# Patient Record
Sex: FEMALE | Race: OTHER | HISPANIC OR LATINO | Employment: FULL TIME | URBAN - METROPOLITAN AREA
[De-identification: names, ages, dates, MRNs, and addresses within clinical notes are randomized per-mention and may not be internally consistent; named-entity substitution may affect disease eponyms.]

---

## 2017-10-21 ENCOUNTER — OFFICE VISIT (OUTPATIENT)
Dept: URGENT CARE | Facility: CLINIC | Age: 47
End: 2017-10-21
Payer: COMMERCIAL

## 2017-10-21 ENCOUNTER — APPOINTMENT (OUTPATIENT)
Dept: RADIOLOGY | Facility: CLINIC | Age: 47
End: 2017-10-21
Payer: COMMERCIAL

## 2017-10-21 DIAGNOSIS — S99.919A INJURY OF ANKLE: ICD-10-CM

## 2017-10-21 PROCEDURE — 73610 X-RAY EXAM OF ANKLE: CPT

## 2017-10-21 PROCEDURE — 99213 OFFICE O/P EST LOW 20 MIN: CPT

## 2017-10-22 NOTE — PROGRESS NOTES
Assessment  1  Sprain of other ligament of right ankle, initial encounter (416 57) (B89 276Q)    Plan  Ankle injury    · * XR ANKLE 3+ VIEW RIGHT; Status:Resulted - Requires Verification;   Done: [id number]  08:23AM  Sprain of other ligament of right ankle, initial encounter    · Ace Bandage; Status:Complete;   Done: [id number]   · Air cast; Status:Complete;   Done: [id number]    Discussion/Summary  Discussion Summary:   As we discussed, the x-rays are negative for fracture  This is a sprain  These injuries continue a while to heal  Follow-up with your family physician for further evaluation if you are still having pain and swelling after a couple of weeks  Chief Complaint  1  Ankle Pain  Chief Complaint Free Text Note Form: Yesterday the patient reports rolling her right ankle  She reports increased pain with walking and pressure  History of Present Illness  HPI: Patient is a 48-year-old female who injured her right ankle when it twisted as she got off the bus yesterday  She is having increased pain, and difficulty walking  Hospital Based Practices Required Assessment:   Pain Assessment   the patient states they have pain  The pain is located in the right ankle  The patient describes the pain as throbbing  (on a scale of 0 to 10, the patient rates the pain at 3 )   Abuse And Domestic Violence Screen    Yes, the patient is safe at home  -- The patient states no one is hurting them  Depression And Suicide Screen  No, the patient has not had thoughts of hurting themself  No, the patient has not felt depressed in the past 7 days  Review of Systems  Focused-Female:   Constitutional: No fever, no chills, feels well, no tiredness, no recent weight gain or loss  ENT: no ear ache, no loss of hearing, no nosebleeds or nasal discharge, no sore throat or hoarseness  Respiratory: no complaints of shortness of breath, no wheezing, no dyspnea on exertion, no orthopnea or PND     Musculoskeletal: as noted in HPI    Integumentary: no complaints of skin rash or lesion, no itching or dry skin, no skin wounds  Active Problems  1  Acute bronchitis (466 0) (J20 9)    Past Medical History  1  Acute sinusitis (461 9) (J01 90)   2  History of asthma (V12 69) (Z87 09)    Family History  Mother    1  No pertinent family history    Social History   · Never smoker   · No drug use   · Social alcohol use (Z78 9)    Current Meds   1  No Reported Medications Recorded    Allergies  1  Penicillins    Vitals  Signs   Recorded: [id number] 08:14AM   Temperature: 99 2 F  Heart Rate: 100  Respiration: 16  Systolic: 324  Diastolic: 88  Height: 5 ft 2 in  Weight: 172 lb   BMI Calculated: 31 46  BSA Calculated: 1 79  O2 Saturation: 96  Pain Scale: 3    Physical Exam    Constitutional   General appearance: No acute distress, well appearing and well nourished  Eyes   Conjunctiva and lids: No swelling, erythema or discharge  Ears, Nose, Mouth, and Throat   External inspection of ears and nose: Normal     Pulmonary   Respiratory effort: No increased work of breathing or signs of respiratory distress  Musculoskeletal   Gait and station: Abnormal  -- She is walking with a cane and exhibits significant limp  Digits and nails: Normal without clubbing or cyanosis  Inspection/palpation of joints, bones, and muscles: Abnormal  -- The ankle appears to be stable  There is swelling and tenderness over the lateral malleolus  Skin   Skin and subcutaneous tissue: Normal without rashes or lesions  Psychiatric   Orientation to person, place, and time: Normal     Mood and affect: Normal        Results/Data  * XR ANKLE 3+ VIEW RIGHT 21Oct2017 08:23AM [facility], [address] [place] [address] Order Number: [id number]     Test Name Result Flag Reference   XR ANKLE 3+ VW RIGHT (Report)     RIGHT ANKLE     INDICATION: Right ankle pain and swelling, twisted ankle yesterday       COMPARISON: None     VIEWS: 3     IMAGES: 3     FINDINGS:     There is no acute fracture or dislocation  Suspected small ankle joint effusion  No degenerative changes  No lytic or blastic lesions are seen  Soft tissue swelling overlying the lateral malleolus  IMPRESSION:     No acute osseous abnormality  Lateral soft tissue swelling  Workstation performed: [id number]     Signed by:   Marshall Vaughn DO   10/21/17       Message  Return to work or school:   Alvaro Ballesteros is under my professional care   She was seen in my office on 10/21/2017    She is not able to return to work until 10/25/17           Signatures   Electronically signed by : Roberta Hartley MD; Oct 21 2017 10:19AM EST                       (Author)

## 2019-09-10 ENCOUNTER — OFFICE VISIT (OUTPATIENT)
Dept: GASTROENTEROLOGY | Facility: CLINIC | Age: 49
End: 2019-09-10
Payer: COMMERCIAL

## 2019-09-10 ENCOUNTER — TELEPHONE (OUTPATIENT)
Dept: GASTROENTEROLOGY | Facility: CLINIC | Age: 49
End: 2019-09-10

## 2019-09-10 ENCOUNTER — DOCUMENTATION (OUTPATIENT)
Dept: GASTROENTEROLOGY | Facility: CLINIC | Age: 49
End: 2019-09-10

## 2019-09-10 VITALS
DIASTOLIC BLOOD PRESSURE: 74 MMHG | HEART RATE: 84 BPM | BODY MASS INDEX: 32.39 KG/M2 | HEIGHT: 62 IN | SYSTOLIC BLOOD PRESSURE: 122 MMHG | WEIGHT: 176 LBS

## 2019-09-10 DIAGNOSIS — K21.9 GASTROESOPHAGEAL REFLUX DISEASE, ESOPHAGITIS PRESENCE NOT SPECIFIED: Primary | ICD-10-CM

## 2019-09-10 DIAGNOSIS — R13.10 DYSPHAGIA, UNSPECIFIED TYPE: ICD-10-CM

## 2019-09-10 PROCEDURE — 99204 OFFICE O/P NEW MOD 45 MIN: CPT | Performed by: NURSE PRACTITIONER

## 2019-09-10 RX ORDER — RANITIDINE 150 MG/1
150 TABLET ORAL 2 TIMES DAILY
COMMUNITY

## 2019-09-10 NOTE — PROGRESS NOTES
[facility] - Outpatient Consultation  Belinda Live 51 y o  female MRN: [id number]  Encounter: [id number]      Chief Complaint   Patient presents with    Dysphagia     1  Gastroesophageal reflux disease, esophagitis presence not specified  New onset of GERD that has progressed over the past year and is only partially alleviated with H2 antagonist therapy with Zantac over-the-counter medication  Exclude the presence of Baer's esophagus, esophagitis or gastritis  Rule out peptic ulcer disease     - Nexium 20 mg daily  Sample boxes prior to the patient  - can add Zantac at nighttime if not effective  - reviewed lifestyle modifications to include no eating prior to bedtime, avoiding spicy, fried and acidic type foods and caffeinated beverages  - arrange for an upper endoscopy    2  Dysphagia, unspecified type  Solid-food dysphagia that has also progressed over the past year  Possibilities include an esophageal ring or stricture  Unlikely a gastric or and esophageal neoplasm     - an upper endoscopy to be arranged in the near future      Followup Appointment:  6 weeks  ______________________________________________________________________    Chief Complaint   Patient presents with    Dysphagia       HPI:   Belinda Live is a 51y o  year old female who presents to the office for progressive heartburn and solid food dysphagia over the past year  Occasional odynophagia  Denies early satiety or weight loss  Denies frequent use of NSAIDs  Was on doxycycline for an upper respiratory infection a few months ago  Has gained 30 lb over the past 2 years  Associated water brash  Denies nausea or vomiting  No melena or rectal bleeding  Has always been more on the constipated side      Historical Information   Past Medical History:   Diagnosis Date    Fatigue     Chronic fatigue    GERD (gastroesophageal reflux disease)     Obesity      Past Surgical History:   Procedure Laterality Date    EXPLORATORY LAPAROTOMY       Social History     Substance and Sexual Activity   Alcohol Use Yes    Frequency: 2-4 times a month    Drinks per session: 1 or 2     Social History     Substance and Sexual Activity   Drug Use Not on file     Social History     Tobacco Use   Smoking Status Never Smoker   Smokeless Tobacco Never Used     Family History   Problem Relation Age of Onset    No Known Problems Mother     No Known Problems Father     No Known Problems Sister     No Known Problems Brother        Meds/Allergies     Current Outpatient Medications:     ranitidine (ZANTAC) 150 mg tablet    Allergies   Allergen Reactions    Penicillins        PHYSICAL EXAM:    Blood pressure 122/74, pulse 84, height 5' 2" (1 575 m), weight 79 8 kg (176 lb)  Body mass index is 32 19 kg/m²  General Appearance: NAD, cooperative, alert  Eyes: Anicteric, PERRLA, EOMI  ENT:  Normocephalic, atraumatic, normal mucosa  Neck:  Supple, symmetrical, trachea midline  Resp:  Clear to auscultation bilaterally; no rales, rhonchi or wheezing; respirations unlabored   CV:  S1 S2, Regular rate and rhythm; no murmur, rub, or gallop  GI:  Soft, non-tender, non-distended; normal bowel sounds; no masses, no organomegaly   Rectal: Deferred  :  Deferred   Musculoskeletal: No cyanosis, clubbing or edema  Normal ROM  Skin:  No jaundice, rashes, or lesions   Back: No CVA tenderness  Psych: Normal affect, good eye contact  Neuro: No gross deficits, AAOx3    Lab Results:   No results found for: WBC, HGB, HCT, MCV, PLT  No results found for: NA, K, CL, CO2, ANIONGAP, BUN, CREATININE, GLUCOSE, GLUF, CALCIUM, CORRECTEDCA, AST, ALT, ALKPHOS, PROT, BILITOT, EGFR  No results found for: IRON, TIBC, FERRITIN  No results found for: LIPASE    Radiology Results:   No results found  REVIEW OF SYSTEMS:    CONSTITUTIONAL: Denies any fever, chills, rigors, and weight loss    Positive for fatigue, night sweats and weight gain  HEENT: No earache or tinnitus  Denies hearing loss or visual disturbances  CARDIOVASCULAR: No chest pain or palpitations  RESPIRATORY: Denies any cough, hemoptysis, shortness of breath or dyspnea on exertion  GASTROINTESTINAL: As noted in the History of Present Illness  GENITOURINARY: No problems with urination  Denies any hematuria or dysuria  NEUROLOGIC: No dizziness or vertigo, denies headaches  MUSCULOSKELETAL: Denies any muscle or joint pain  SKIN: Denies skin rashes or itching  ENDOCRINE: Denies excessive thirst  Denies intolerance to heat or cold  PSYCHOSOCIAL: Denies depression or anxiety  Denies any recent memory loss

## 2019-09-10 NOTE — PATIENT INSTRUCTIONS
Try to avoid spicy, fatty, spicy and caffeinated foods  Try to avoid eating 3 hours before bedtime  Trial of Nexium 20 mg daily    Samples provided  Will arrange for an upper endoscopy

## 2021-08-03 ENCOUNTER — OFFICE VISIT (OUTPATIENT)
Dept: GASTROENTEROLOGY | Facility: CLINIC | Age: 51
End: 2021-08-03
Payer: COMMERCIAL

## 2021-08-03 VITALS
WEIGHT: 160 LBS | BODY MASS INDEX: 29.44 KG/M2 | DIASTOLIC BLOOD PRESSURE: 80 MMHG | TEMPERATURE: 98.5 F | HEART RATE: 107 BPM | HEIGHT: 62 IN | SYSTOLIC BLOOD PRESSURE: 120 MMHG

## 2021-08-03 DIAGNOSIS — Z12.11 SCREENING FOR COLORECTAL CANCER: ICD-10-CM

## 2021-08-03 DIAGNOSIS — K21.9 GASTROESOPHAGEAL REFLUX DISEASE, UNSPECIFIED WHETHER ESOPHAGITIS PRESENT: ICD-10-CM

## 2021-08-03 DIAGNOSIS — Z12.12 SCREENING FOR COLORECTAL CANCER: ICD-10-CM

## 2021-08-03 DIAGNOSIS — R13.10 DYSPHAGIA, UNSPECIFIED TYPE: Primary | ICD-10-CM

## 2021-08-03 PROCEDURE — 99203 OFFICE O/P NEW LOW 30 MIN: CPT | Performed by: PHYSICIAN ASSISTANT

## 2021-08-03 RX ORDER — FEXOFENADINE HCL 180 MG/1
180 TABLET ORAL DAILY
COMMUNITY
Start: 2021-07-07

## 2021-08-03 RX ORDER — ALBUTEROL SULFATE 90 UG/1
AEROSOL, METERED RESPIRATORY (INHALATION)
COMMUNITY
Start: 2021-05-30

## 2021-08-03 RX ORDER — DEXTROAMPHETAMINE SACCHARATE, AMPHETAMINE ASPARTATE MONOHYDRATE, DEXTROAMPHETAMINE SULFATE AND AMPHETAMINE SULFATE 5; 5; 5; 5 MG/1; MG/1; MG/1; MG/1
20 CAPSULE, EXTENDED RELEASE ORAL DAILY
COMMUNITY
Start: 2021-07-26

## 2021-08-03 RX ORDER — DEXAMETHASONE 4 MG/1
1 TABLET ORAL 2 TIMES DAILY
COMMUNITY
Start: 2021-07-21

## 2021-08-03 RX ORDER — FLUTICASONE PROPIONATE 50 MCG
1 SPRAY, SUSPENSION (ML) NASAL 2 TIMES DAILY
COMMUNITY
Start: 2021-06-04

## 2021-08-03 RX ORDER — DEXTROAMPHETAMINE SACCHARATE, AMPHETAMINE ASPARTATE, DEXTROAMPHETAMINE SULFATE AND AMPHETAMINE SULFATE 2.5; 2.5; 2.5; 2.5 MG/1; MG/1; MG/1; MG/1
TABLET ORAL
COMMUNITY
Start: 2021-07-28

## 2021-08-03 NOTE — PROGRESS NOTES
[facility] - Outpatient Consultation  Irene Garcia 45 y o  female MRN: [id number]  Encounter: [id number]          ASSESSMENT AND PLAN:      1  Dysphagia, unspecified type  She reports progressive difficulty swallowing solid food over the past 2 years  She had more mild symptoms in 2019 and underwent EGD which showed hiatal hernia and moderate reflux esophagitis  There was no evidence of stricture, ring, or eosinophilic esophagitis  Since her symptoms have worsened, I would recommend repeat EGD at this time  If her EGD is negative, I would recommend esophageal manometry testing to assess for motility disorder  We discussed taking small bites of food, chewing carefully, drinking plenty of water after each bite  - EGD; Future    2  Gastroesophageal reflux disease, unspecified whether esophagitis present  She reports history of heartburn which was evaluated with EGD in 2019  This showed hiatal hernia and moderate reflux esophagitis but no evidence of Baer's esophagus  Her heartburn resolved with dietary changes  She is no longer on acid suppression  3  Screening for colorectal cancer  She has a Cologuard test kit at home which she will complete soon  Follow-up after EGD  ______________________________________________________________________    HPI:   53-year-old female with history of GERD presenting for evaluation of dysphagia  She was seen by [facility] gastroenterology back in September 2019 for these symptoms  She underwent EGD which showed hiatal hernia and erythema at the gastroesophageal junction  Biopsies consistent with reflux esophagitis and negative for Baer's esophagus, EOE, malignancy  She states that her dysphagia was mild at that time but has gotten significantly worse over the past 2 years  She has difficulty swallowing solid food every time she eats  The only food that does not cause her symptoms is salad    She feels the food gets stuck in her lower chest  She needs to take very small bites and drink plenty of water after each bite to get the food down  She also reports some pain with swallowing as well  She denies any heartburn as she recently changed her diet  She is not on any acid suppression at current time  She was on Nexium several months ago which did help her heartburn but not the dysphagia  REVIEW OF SYSTEMS:    CONSTITUTIONAL: Denies any fever, chills, rigors, and weight loss  HEENT: No earache or tinnitus  Denies hearing loss or visual disturbances  CARDIOVASCULAR: No chest pain or palpitations  RESPIRATORY: Denies any cough, hemoptysis, shortness of breath or dyspnea on exertion  GASTROINTESTINAL: As noted in the History of Present Illness  GENITOURINARY: No problems with urination  Denies any hematuria or dysuria  NEUROLOGIC: No dizziness or vertigo, denies headaches  MUSCULOSKELETAL: Denies any muscle or joint pain  SKIN: Denies skin rashes or itching  ENDOCRINE: Denies excessive thirst  Denies intolerance to heat or cold  PSYCHOSOCIAL: Denies depression or anxiety  Denies any recent memory loss         Historical Information   Past Medical History:   Diagnosis Date    Fatigue     Chronic fatigue    GERD (gastroesophageal reflux disease)     Obesity      Past Surgical History:   Procedure Laterality Date    EXPLORATORY LAPAROTOMY       Social History   Social History     Substance and Sexual Activity   Alcohol Use Yes     Social History     Substance and Sexual Activity   Drug Use Not on file     Social History     Tobacco Use   Smoking Status Never Smoker   Smokeless Tobacco Never Used     Family History   Problem Relation Age of Onset    No Known Problems Mother     No Known Problems Father     No Known Problems Sister     No Known Problems Brother        Meds/Allergies       Current Outpatient Medications:     albuterol (PROVENTIL HFA,VENTOLIN HFA) 90 mcg/act inhaler    amphetamine-dextroamphetamine (ADDERALL XR) 20 MG 24 hr capsule    amphetamine-dextroamphetamine (ADDERALL) 10 mg tablet    fexofenadine (ALLEGRA) 180 MG tablet    Flovent  MCG/ACT inhaler    fluticasone (FLONASE) 50 mcg/act nasal spray    ranitidine (ZANTAC) 150 mg tablet    Allergies   Allergen Reactions    Penicillins            Objective     Blood pressure 120/80, pulse (!) 107, temperature 98 5 °F (36 9 °C), temperature source Tympanic, height 5' 2" (1 575 m), weight 72 6 kg (160 lb)  Body mass index is 29 26 kg/m²  PHYSICAL EXAM:      General Appearance:   Alert, cooperative, no distress   HEENT:   Normocephalic, atraumatic, anicteric      Neck:  Supple, symmetrical, trachea midline   Lungs:   Clear to auscultation bilaterally; no rales, rhonchi or wheezing; respirations unlabored    Heart[de-identified]   Regular rate and rhythm; no murmur, rub, or gallop  Abdomen:   Soft, non-tender, non-distended; normal bowel sounds; no masses, no organomegaly    Genitalia:   Deferred    Rectal:   Deferred    Extremities:  No cyanosis, clubbing or edema    Pulses:  2+ and symmetric    Skin:  No jaundice, rashes, or lesions    Lymph nodes:  No palpable cervical lymphadenopathy        Lab Results:   No visits with results within 1 Day(s) from this visit  Latest known visit with results is:   No results found for any previous visit  Radiology Results:   No results found

## 2021-08-03 NOTE — LETTER
August 3, 2021     [facility] [address]  [address] [facility]    Patient: Mariano Arreola   YOB: 1970   Date of Visit: 8/3/2021       Dear Dr Saintclair Curling: Thank you for referring Mariano Arreola to me for evaluation  Below are my notes for this consultation  If you have questions, please do not hesitate to call me  I look forward to following your patient along with you  Sincerely,        Balinda Hammans, PA-C        CC: No Recipients  Balinda Hammans, PA-C  8/3/2021 11:01 AM  Sign when Signing Visit  [facility] - Outpatient Consultation  Mariano Arreola 45 y o  female MRN: [id number]  Encounter: [id number]          ASSESSMENT AND PLAN:      1  Dysphagia, unspecified type  She reports progressive difficulty swallowing solid food over the past 2 years  She had more mild symptoms in 2019 and underwent EGD which showed hiatal hernia and moderate reflux esophagitis  There was no evidence of stricture, ring, or eosinophilic esophagitis  Since her symptoms have worsened, I would recommend repeat EGD at this time  If her EGD is negative, I would recommend esophageal manometry testing to assess for motility disorder  We discussed taking small bites of food, chewing carefully, drinking plenty of water after each bite  - EGD; Future    2  Gastroesophageal reflux disease, unspecified whether esophagitis present  She reports history of heartburn which was evaluated with EGD in 2019  This showed hiatal hernia and moderate reflux esophagitis but no evidence of Baer's esophagus  Her heartburn resolved with dietary changes  She is no longer on acid suppression  3  Screening for colorectal cancer  She has a Cologuard test kit at home which she will complete soon  Follow-up after EGD  ______________________________________________________________________    HPI:   62-year-old female with history of GERD presenting for evaluation of dysphagia  She was seen by [facility] gastroenterology back in September 2019 for these symptoms  She underwent EGD which showed hiatal hernia and erythema at the gastroesophageal junction  Biopsies consistent with reflux esophagitis and negative for Baer's esophagus, EOE, malignancy  She states that her dysphagia was mild at that time but has gotten significantly worse over the past 2 years  She has difficulty swallowing solid food every time she eats  The only food that does not cause her symptoms is salad  She feels the food gets stuck in her lower chest   She needs to take very small bites and drink plenty of water after each bite to get the food down  She also reports some pain with swallowing as well  She denies any heartburn as she recently changed her diet  She is not on any acid suppression at current time  She was on Nexium several months ago which did help her heartburn but not the dysphagia  REVIEW OF SYSTEMS:    CONSTITUTIONAL: Denies any fever, chills, rigors, and weight loss  HEENT: No earache or tinnitus  Denies hearing loss or visual disturbances  CARDIOVASCULAR: No chest pain or palpitations  RESPIRATORY: Denies any cough, hemoptysis, shortness of breath or dyspnea on exertion  GASTROINTESTINAL: As noted in the History of Present Illness  GENITOURINARY: No problems with urination  Denies any hematuria or dysuria  NEUROLOGIC: No dizziness or vertigo, denies headaches  MUSCULOSKELETAL: Denies any muscle or joint pain  SKIN: Denies skin rashes or itching  ENDOCRINE: Denies excessive thirst  Denies intolerance to heat or cold  PSYCHOSOCIAL: Denies depression or anxiety  Denies any recent memory loss         Historical Information   Past Medical History:   Diagnosis Date    Fatigue     Chronic fatigue    GERD (gastroesophageal reflux disease)     Obesity      Past Surgical History:   Procedure Laterality Date    EXPLORATORY LAPAROTOMY       Social History   Social History Substance and Sexual Activity   Alcohol Use Yes     Social History     Substance and Sexual Activity   Drug Use Not on file     Social History     Tobacco Use   Smoking Status Never Smoker   Smokeless Tobacco Never Used     Family History   Problem Relation Age of Onset    No Known Problems Mother     No Known Problems Father     No Known Problems Sister     No Known Problems Brother        Meds/Allergies       Current Outpatient Medications:     albuterol (PROVENTIL HFA,VENTOLIN HFA) 90 mcg/act inhaler    amphetamine-dextroamphetamine (ADDERALL XR) 20 MG 24 hr capsule    amphetamine-dextroamphetamine (ADDERALL) 10 mg tablet    fexofenadine (ALLEGRA) 180 MG tablet    Flovent  MCG/ACT inhaler    fluticasone (FLONASE) 50 mcg/act nasal spray    ranitidine (ZANTAC) 150 mg tablet    Allergies   Allergen Reactions    Penicillins            Objective     Blood pressure 120/80, pulse (!) 107, temperature 98 5 °F (36 9 °C), temperature source Tympanic, height 5' 2" (1 575 m), weight 72 6 kg (160 lb)  Body mass index is 29 26 kg/m²  PHYSICAL EXAM:      General Appearance:   Alert, cooperative, no distress   HEENT:   Normocephalic, atraumatic, anicteric      Neck:  Supple, symmetrical, trachea midline   Lungs:   Clear to auscultation bilaterally; no rales, rhonchi or wheezing; respirations unlabored    Heart[de-identified]   Regular rate and rhythm; no murmur, rub, or gallop  Abdomen:   Soft, non-tender, non-distended; normal bowel sounds; no masses, no organomegaly    Genitalia:   Deferred    Rectal:   Deferred    Extremities:  No cyanosis, clubbing or edema    Pulses:  2+ and symmetric    Skin:  No jaundice, rashes, or lesions    Lymph nodes:  No palpable cervical lymphadenopathy        Lab Results:   No visits with results within 1 Day(s) from this visit  Latest known visit with results is:   No results found for any previous visit  Radiology Results:   No results found

## 2021-08-03 NOTE — PATIENT INSTRUCTIONS
EGD scheduled for 9/9 with Dr Arnol Gayle at [facility] provided verbal instructions/ paper work given

## 2021-09-08 ENCOUNTER — TELEPHONE (OUTPATIENT)
Dept: GASTROENTEROLOGY | Facility: HOSPITAL | Age: 51
End: 2021-09-08

## 2021-09-09 ENCOUNTER — ANESTHESIA (OUTPATIENT)
Dept: GASTROENTEROLOGY | Facility: HOSPITAL | Age: 51
End: 2021-09-09

## 2021-09-09 ENCOUNTER — HOSPITAL ENCOUNTER (OUTPATIENT)
Dept: GASTROENTEROLOGY | Facility: HOSPITAL | Age: 51
Setting detail: OUTPATIENT SURGERY
Discharge: HOME | End: 2021-09-09
Attending: INTERNAL MEDICINE
Payer: COMMERCIAL

## 2021-09-09 ENCOUNTER — ANESTHESIA EVENT (OUTPATIENT)
Dept: GASTROENTEROLOGY | Facility: HOSPITAL | Age: 51
End: 2021-09-09

## 2021-09-09 VITALS
RESPIRATION RATE: 16 BRPM | TEMPERATURE: 97.5 F | HEART RATE: 72 BPM | SYSTOLIC BLOOD PRESSURE: 116 MMHG | DIASTOLIC BLOOD PRESSURE: 75 MMHG | OXYGEN SATURATION: 98 %

## 2021-09-09 DIAGNOSIS — R13.10 DYSPHAGIA, UNSPECIFIED TYPE: ICD-10-CM

## 2021-09-09 DIAGNOSIS — K21.00 GASTROESOPHAGEAL REFLUX DISEASE WITH ESOPHAGITIS WITHOUT HEMORRHAGE: Primary | ICD-10-CM

## 2021-09-09 PROBLEM — J45.909 ASTHMA: Status: ACTIVE | Noted: 2021-09-09

## 2021-09-09 LAB
EXT PREGNANCY TEST URINE: NEGATIVE
EXT. CONTROL: NORMAL

## 2021-09-09 PROCEDURE — 81025 URINE PREGNANCY TEST: CPT | Performed by: ANESTHESIOLOGY

## 2021-09-09 PROCEDURE — C1726 CATH, BAL DIL, NON-VASCULAR: HCPCS

## 2021-09-09 PROCEDURE — 88305 TISSUE EXAM BY PATHOLOGIST: CPT | Performed by: PATHOLOGY

## 2021-09-09 PROCEDURE — 43249 ESOPH EGD DILATION <30 MM: CPT | Performed by: INTERNAL MEDICINE

## 2021-09-09 PROCEDURE — 43239 EGD BIOPSY SINGLE/MULTIPLE: CPT | Performed by: INTERNAL MEDICINE

## 2021-09-09 RX ORDER — LIDOCAINE HYDROCHLORIDE 10 MG/ML
INJECTION, SOLUTION EPIDURAL; INFILTRATION; INTRACAUDAL; PERINEURAL AS NEEDED
Status: DISCONTINUED | OUTPATIENT
Start: 2021-09-09 | End: 2021-09-09

## 2021-09-09 RX ORDER — PROPOFOL 10 MG/ML
INJECTION, EMULSION INTRAVENOUS AS NEEDED
Status: DISCONTINUED | OUTPATIENT
Start: 2021-09-09 | End: 2021-09-09

## 2021-09-09 RX ORDER — OMEPRAZOLE 40 MG/1
40 CAPSULE, DELAYED RELEASE ORAL DAILY
Qty: 90 CAPSULE | Refills: 1 | Status: SHIPPED | OUTPATIENT
Start: 2021-09-09 | End: 2022-02-19

## 2021-09-09 RX ORDER — SODIUM CHLORIDE 9 MG/ML
INJECTION, SOLUTION INTRAVENOUS CONTINUOUS PRN
Status: DISCONTINUED | OUTPATIENT
Start: 2021-09-09 | End: 2021-09-09

## 2021-09-09 RX ADMIN — PROPOFOL 120 MG: 10 INJECTION, EMULSION INTRAVENOUS at 14:34

## 2021-09-09 RX ADMIN — PROPOFOL 30 MG: 10 INJECTION, EMULSION INTRAVENOUS at 14:36

## 2021-09-09 RX ADMIN — SODIUM CHLORIDE: 9 INJECTION, SOLUTION INTRAVENOUS at 14:25

## 2021-09-09 RX ADMIN — LIDOCAINE HYDROCHLORIDE 70 MG: 10 INJECTION, SOLUTION EPIDURAL; INFILTRATION; INTRACAUDAL; PERINEURAL at 14:34

## 2021-09-09 RX ADMIN — PROPOFOL 30 MG: 10 INJECTION, EMULSION INTRAVENOUS at 14:46

## 2021-09-09 RX ADMIN — PROPOFOL 30 MG: 10 INJECTION, EMULSION INTRAVENOUS at 14:43

## 2021-09-09 RX ADMIN — PROPOFOL 50 MG: 10 INJECTION, EMULSION INTRAVENOUS at 14:40

## 2021-09-09 NOTE — ANESTHESIA PREPROCEDURE EVALUATION
Procedure:  EGD    Relevant Problems   GI/HEPATIC   (+) Dysphagia   (+) GERD (gastroesophageal reflux disease)      PULMONARY   (+) Asthma        Physical Exam    Airway    Mallampati score: II  TM Distance: >3 FB  Neck ROM: full     Dental   No notable dental hx     Cardiovascular  Rate: normal,     Pulmonary  Breath sounds clear to auscultation,     Other Findings        Anesthesia Plan  ASA Score- 2     Anesthesia Type- IV sedation with anesthesia with ASA Monitors  Additional Monitors:   Airway Plan:           Plan Factors-    Chart reviewed  Patient summary reviewed  Induction- intravenous  Postoperative Plan-     Informed Consent- Anesthetic plan and risks discussed with patient

## 2021-09-09 NOTE — ANESTHESIA POSTPROCEDURE EVALUATION
Post-Op Assessment Note    CV Status:  Stable    Pain management: adequate     Mental Status:  Alert and awake   Hydration Status:  Euvolemic   PONV Controlled:  Controlled   Airway Patency:  Patent      Post Op Vitals Reviewed: Yes      Staff: CRNA         No complications documented      /71 (09/09/21 1453)    Temp 97 5 °F (36 4 °C) (09/09/21 1453)    Pulse 105 (09/09/21 1453)   Resp 18 (09/09/21 1453)    SpO2 99 % (09/09/21 1453)

## 2021-09-09 NOTE — H&P
History and Physical - [facility]  Tyson Montejo 45 y o  female MRN: [id number]    HPI: Tyson Montejo is a 45y o  year old female who presents with GERD and dysphagia  Review of Systems    Historical Information   Past Medical History:   Diagnosis Date    Fatigue     Chronic fatigue    GERD (gastroesophageal reflux disease)     Obesity      Past Surgical History:   Procedure Laterality Date    EXPLORATORY LAPAROTOMY       Social History   Social History     Substance and Sexual Activity   Alcohol Use Yes     Social History     Substance and Sexual Activity   Drug Use Not on file     Social History     Tobacco Use   Smoking Status Never Smoker   Smokeless Tobacco Never Used     Family History   Problem Relation Age of Onset    No Known Problems Mother     No Known Problems Father     No Known Problems Sister     No Known Problems Brother        Meds/Allergies     (Not in a hospital admission)      Allergies   Allergen Reactions    Penicillins        Objective     /85   Pulse 92   Temp 97 7 °F (36 5 °C) (Tympanic)   Resp 18   LMP 09/02/2021   SpO2 99%       PHYSICAL EXAM    Gen: NAD  CV: RRR  CHEST: Clear  ABD: soft, NT/ND  EXT: no edema  Neuro: AAO      ASSESSMENT/PLAN:  This is a 45y o  year old female here for EGD for GERD and dysphagia       PLAN:   Procedure: EGD

## 2022-02-18 DIAGNOSIS — K21.00 GASTROESOPHAGEAL REFLUX DISEASE WITH ESOPHAGITIS WITHOUT HEMORRHAGE: ICD-10-CM

## 2022-02-18 DIAGNOSIS — R13.10 DYSPHAGIA, UNSPECIFIED TYPE: ICD-10-CM

## 2022-02-19 RX ORDER — OMEPRAZOLE 40 MG/1
CAPSULE, DELAYED RELEASE ORAL
Qty: 90 CAPSULE | Refills: 1 | Status: SHIPPED | OUTPATIENT
Start: 2022-02-19

## 2022-06-01 ENCOUNTER — TELEPHONE (OUTPATIENT)
Dept: CARDIAC SURGERY | Facility: CLINIC | Age: 52
End: 2022-06-01

## 2022-06-04 ENCOUNTER — APPOINTMENT (EMERGENCY)
Dept: CT IMAGING | Facility: HOSPITAL | Age: 52
End: 2022-06-04
Payer: COMMERCIAL

## 2022-06-04 ENCOUNTER — HOSPITAL ENCOUNTER (EMERGENCY)
Facility: HOSPITAL | Age: 52
Discharge: HOME | End: 2022-06-04
Attending: EMERGENCY MEDICINE
Payer: COMMERCIAL

## 2022-06-04 VITALS
HEIGHT: 62 IN | OXYGEN SATURATION: 99 % | TEMPERATURE: 97.6 F | HEART RATE: 92 BPM | DIASTOLIC BLOOD PRESSURE: 84 MMHG | WEIGHT: 170 LBS | RESPIRATION RATE: 19 BRPM | SYSTOLIC BLOOD PRESSURE: 123 MMHG | BODY MASS INDEX: 31.28 KG/M2

## 2022-06-04 DIAGNOSIS — F10.929 ALCOHOL INTOXICATION (HCC): ICD-10-CM

## 2022-06-04 DIAGNOSIS — R41.82 ALTERED MENTAL STATUS: Primary | ICD-10-CM

## 2022-06-04 LAB
ALBUMIN SERPL BCP-MCNC: 3.3 G/DL (ref 3.5–5)
ALP SERPL-CCNC: 82 U/L (ref 46–116)
ALT SERPL W P-5'-P-CCNC: 29 U/L (ref 12–78)
AMPHETAMINES SERPL QL SCN: POSITIVE
ANION GAP SERPL CALCULATED.3IONS-SCNC: 12 MMOL/L (ref 4–13)
APAP SERPL-MCNC: <2 UG/ML (ref 10–20)
APTT PPP: 29 SECONDS (ref 23–37)
AST SERPL W P-5'-P-CCNC: 20 U/L (ref 5–45)
BACTERIA UR QL AUTO: ABNORMAL /HPF
BARBITURATES UR QL: NEGATIVE
BASOPHILS # BLD AUTO: 0.07 THOUSANDS/ΜL (ref 0–0.1)
BASOPHILS NFR BLD AUTO: 1 % (ref 0–1)
BENZODIAZ UR QL: NEGATIVE
BILIRUB SERPL-MCNC: 0.1 MG/DL (ref 0.2–1)
BILIRUB UR QL STRIP: NEGATIVE
BUN SERPL-MCNC: 15 MG/DL (ref 5–25)
CALCIUM ALBUM COR SERPL-MCNC: 8.7 MG/DL (ref 8.3–10.1)
CALCIUM SERPL-MCNC: 8.1 MG/DL (ref 8.3–10.1)
CHLORIDE SERPL-SCNC: 105 MMOL/L (ref 100–108)
CLARITY UR: CLEAR
CO2 SERPL-SCNC: 23 MMOL/L (ref 21–32)
COCAINE UR QL: NEGATIVE
COLOR UR: YELLOW
CREAT SERPL-MCNC: 0.76 MG/DL (ref 0.6–1.3)
EOSINOPHIL # BLD AUTO: 0.24 THOUSAND/ΜL (ref 0–0.61)
EOSINOPHIL NFR BLD AUTO: 3 % (ref 0–6)
ERYTHROCYTE [DISTWIDTH] IN BLOOD BY AUTOMATED COUNT: 14.7 % (ref 11.6–15.1)
ETHANOL SERPL-MCNC: 304 MG/DL (ref 0–3)
EXT PREG TEST URINE: NEGATIVE
EXT. CONTROL ED NAV: NORMAL
GFR SERPL CREATININE-BSD FRML MDRD: 90 ML/MIN/1.73SQ M
GLUCOSE SERPL-MCNC: 106 MG/DL (ref 65–140)
GLUCOSE SERPL-MCNC: 106 MG/DL (ref 65–140)
GLUCOSE UR STRIP-MCNC: NEGATIVE MG/DL
HCT VFR BLD AUTO: 38 % (ref 34.8–46.1)
HGB BLD-MCNC: 12.6 G/DL (ref 11.5–15.4)
HGB UR QL STRIP.AUTO: ABNORMAL
IMM GRANULOCYTES # BLD AUTO: 0.03 THOUSAND/UL (ref 0–0.2)
IMM GRANULOCYTES NFR BLD AUTO: 0 % (ref 0–2)
INR PPP: 0.91 (ref 0.84–1.19)
KETONES UR STRIP-MCNC: NEGATIVE MG/DL
LEUKOCYTE ESTERASE UR QL STRIP: NEGATIVE
LYMPHOCYTES # BLD AUTO: 3.49 THOUSANDS/ΜL (ref 0.6–4.47)
LYMPHOCYTES NFR BLD AUTO: 44 % (ref 14–44)
MCH RBC QN AUTO: 28.4 PG (ref 26.8–34.3)
MCHC RBC AUTO-ENTMCNC: 33.2 G/DL (ref 31.4–37.4)
MCV RBC AUTO: 86 FL (ref 82–98)
METHADONE UR QL: NEGATIVE
MONOCYTES # BLD AUTO: 0.87 THOUSAND/ΜL (ref 0.17–1.22)
MONOCYTES NFR BLD AUTO: 11 % (ref 4–12)
MUCOUS THREADS UR QL AUTO: ABNORMAL
NEUTROPHILS # BLD AUTO: 3.25 THOUSANDS/ΜL (ref 1.85–7.62)
NEUTS SEG NFR BLD AUTO: 41 % (ref 43–75)
NITRITE UR QL STRIP: NEGATIVE
NON-SQ EPI CELLS URNS QL MICRO: ABNORMAL /HPF
NRBC BLD AUTO-RTO: 0 /100 WBCS
OPIATES UR QL SCN: NEGATIVE
OXYCODONE+OXYMORPHONE UR QL SCN: NEGATIVE
PCP UR QL: NEGATIVE
PH UR STRIP.AUTO: 6 [PH]
PLATELET # BLD AUTO: 393 THOUSANDS/UL (ref 149–390)
PMV BLD AUTO: 8.2 FL (ref 8.9–12.7)
POTASSIUM SERPL-SCNC: 3.7 MMOL/L (ref 3.5–5.3)
PROT SERPL-MCNC: 6.7 G/DL (ref 6.4–8.2)
PROT UR STRIP-MCNC: NEGATIVE MG/DL
PROTHROMBIN TIME: 12.2 SECONDS (ref 11.6–14.5)
RBC # BLD AUTO: 4.44 MILLION/UL (ref 3.81–5.12)
RBC #/AREA URNS AUTO: ABNORMAL /HPF
SALICYLATES SERPL-MCNC: <3 MG/DL (ref 3–20)
SODIUM SERPL-SCNC: 140 MMOL/L (ref 136–145)
SP GR UR STRIP.AUTO: <=1.005 (ref 1–1.03)
THC UR QL: NEGATIVE
UROBILINOGEN UR QL STRIP.AUTO: 0.2 E.U./DL
WBC # BLD AUTO: 7.95 THOUSAND/UL (ref 4.31–10.16)
WBC #/AREA URNS AUTO: ABNORMAL /HPF

## 2022-06-04 PROCEDURE — 80053 COMPREHEN METABOLIC PANEL: CPT | Performed by: EMERGENCY MEDICINE

## 2022-06-04 PROCEDURE — 36415 COLL VENOUS BLD VENIPUNCTURE: CPT | Performed by: EMERGENCY MEDICINE

## 2022-06-04 PROCEDURE — 80179 DRUG ASSAY SALICYLATE: CPT | Performed by: EMERGENCY MEDICINE

## 2022-06-04 PROCEDURE — 96374 THER/PROPH/DIAG INJ IV PUSH: CPT

## 2022-06-04 PROCEDURE — 99285 EMERGENCY DEPT VISIT HI MDM: CPT | Performed by: EMERGENCY MEDICINE

## 2022-06-04 PROCEDURE — 85730 THROMBOPLASTIN TIME PARTIAL: CPT | Performed by: EMERGENCY MEDICINE

## 2022-06-04 PROCEDURE — 70450 CT HEAD/BRAIN W/O DYE: CPT

## 2022-06-04 PROCEDURE — 96375 TX/PRO/DX INJ NEW DRUG ADDON: CPT

## 2022-06-04 PROCEDURE — 93005 ELECTROCARDIOGRAM TRACING: CPT

## 2022-06-04 PROCEDURE — 82077 ASSAY SPEC XCP UR&BREATH IA: CPT | Performed by: EMERGENCY MEDICINE

## 2022-06-04 PROCEDURE — G1004 CDSM NDSC: HCPCS

## 2022-06-04 PROCEDURE — 96361 HYDRATE IV INFUSION ADD-ON: CPT

## 2022-06-04 PROCEDURE — 85025 COMPLETE CBC W/AUTO DIFF WBC: CPT | Performed by: EMERGENCY MEDICINE

## 2022-06-04 PROCEDURE — 82948 REAGENT STRIP/BLOOD GLUCOSE: CPT

## 2022-06-04 PROCEDURE — 81025 URINE PREGNANCY TEST: CPT | Performed by: EMERGENCY MEDICINE

## 2022-06-04 PROCEDURE — 81001 URINALYSIS AUTO W/SCOPE: CPT | Performed by: EMERGENCY MEDICINE

## 2022-06-04 PROCEDURE — 80307 DRUG TEST PRSMV CHEM ANLYZR: CPT | Performed by: EMERGENCY MEDICINE

## 2022-06-04 PROCEDURE — 99285 EMERGENCY DEPT VISIT HI MDM: CPT

## 2022-06-04 PROCEDURE — 80143 DRUG ASSAY ACETAMINOPHEN: CPT | Performed by: EMERGENCY MEDICINE

## 2022-06-04 PROCEDURE — 85610 PROTHROMBIN TIME: CPT | Performed by: EMERGENCY MEDICINE

## 2022-06-04 RX ORDER — LORAZEPAM 2 MG/ML
1 INJECTION INTRAMUSCULAR ONCE
Status: COMPLETED | OUTPATIENT
Start: 2022-06-04 | End: 2022-06-04

## 2022-06-04 RX ORDER — ONDANSETRON 2 MG/ML
4 INJECTION INTRAMUSCULAR; INTRAVENOUS ONCE
Status: COMPLETED | OUTPATIENT
Start: 2022-06-04 | End: 2022-06-04

## 2022-06-04 RX ADMIN — SODIUM CHLORIDE 1000 ML: 0.9 INJECTION, SOLUTION INTRAVENOUS at 03:27

## 2022-06-04 RX ADMIN — LORAZEPAM 1 MG: 2 INJECTION INTRAMUSCULAR; INTRAVENOUS at 03:33

## 2022-06-04 RX ADMIN — ONDANSETRON 4 MG: 2 INJECTION INTRAMUSCULAR; INTRAVENOUS at 03:25

## 2022-06-04 NOTE — ED PROVIDER NOTES
History  Chief Complaint   Patient presents with    Altered Mental Status     Pt's  stated she started slurring words yesterday evening   states pt was not drinking  Pt vomited 2x tonight   states she ate a plant from the garden earlier tonight and does not know if that is what caused the vomiting  Pt reports dizziness  Pt takes various medications but  does not know which ones she took tonight  46 yo F with PMH of GERD, asthma presents to ED with altered mental status  Here with   Both are difficult historians  Apparently pt is very upset because her  was recently dx with cancer, and her cousin recently committed suicide  Last night,  reports pt "wasn't herself" and slurring her speech  No focal deficit  No complaint of pain or HA or CP  No syncope or seizure  Denies drug use/etoh use  Compliant with medications, denies overdose  She ate "a lot" of some plant from the garden they planted - neither of them know what the plant is  They brought it in, Plant ID king showed it to potentially be seawrag - which is considered nontoxic  Pt denies SI/HI  Is oriented x 3  Denies abd pain, urinary sx, or ha  No fevers or rashes  Denies head injury or trauma  History provided by:  Patient, medical records and spouse  History limited by:  Mental status change   used: No    Altered Mental Status  Presenting symptoms: confusion    Most recent episode: Today  Episode history:  Continuous  Timing:  Constant  Chronicity:  New  Context: not drug use and not head injury    Associated symptoms: nausea and vomiting    Associated symptoms: no abdominal pain, normal movement, no bladder incontinence, no difficulty breathing, no fever, no headaches, no light-headedness, no palpitations, no rash and no seizures        Prior to Admission Medications   Prescriptions Last Dose Informant Patient Reported? Taking?    Flovent  MCG/ACT inhaler  Self Yes No   Sig: Inhale 1 puff 2 (two) times a day   albuterol (PROVENTIL HFA,VENTOLIN HFA) 90 mcg/act inhaler  Self Yes No   Sig: inhale 2 puffs every 4 to 6 hours if needed for wheezing   amphetamine-dextroamphetamine (ADDERALL XR) 20 MG 24 hr capsule  Self Yes No   Sig: Take 20 mg by mouth daily   amphetamine-dextroamphetamine (ADDERALL) 10 mg tablet  Self Yes No   Sig: take 1 tablet by mouth once daily AT 3PM   fexofenadine (ALLEGRA) 180 MG tablet  Self Yes No   Sig: Take 180 mg by mouth daily   fluticasone (FLONASE) 50 mcg/act nasal spray  Self Yes No   Si spray 2 (two) times a day   omeprazole (PriLOSEC) 40 MG capsule   No No   Sig: take 1 capsule by mouth once daily   ranitidine (ZANTAC) 150 mg tablet  Self Yes No   Sig: Take 150 mg by mouth 2 (two) times a day   Patient not taking: Reported on 8/3/2021      Facility-Administered Medications: None       Past Medical History:   Diagnosis Date    Fatigue     Chronic fatigue    GERD (gastroesophageal reflux disease)     Hiatal hernia     Obesity        Past Surgical History:   Procedure Laterality Date    EXPLORATORY LAPAROTOMY         Family History   Problem Relation Age of Onset    No Known Problems Mother     No Known Problems Father     No Known Problems Sister     No Known Problems Brother      I have reviewed and agree with the history as documented  E-Cigarette/Vaping    E-Cigarette Use Never User      E-Cigarette/Vaping Substances    Nicotine No     THC No     CBD No     Flavoring No     Other No     Unknown No      Social History     Tobacco Use    Smoking status: Never Smoker    Smokeless tobacco: Never Used   Vaping Use    Vaping Use: Never used   Substance Use Topics    Alcohol use: Not Currently    Drug use: Never       Review of Systems   Constitutional: Negative for appetite change, chills, fatigue and fever  HENT: Negative for congestion, ear pain, rhinorrhea, sore throat, trouble swallowing and voice change      Eyes: Negative for pain and visual disturbance  Respiratory: Negative for cough, chest tightness and shortness of breath  Cardiovascular: Negative for chest pain, palpitations and leg swelling  Gastrointestinal: Positive for nausea and vomiting  Negative for abdominal pain, blood in stool, constipation and diarrhea  Genitourinary: Negative for bladder incontinence, difficulty urinating, dysuria and hematuria  Musculoskeletal: Negative for back pain, neck pain and neck stiffness  Skin: Negative for rash  Neurological: Negative for dizziness, seizures, syncope, speech difficulty, light-headedness and headaches  Psychiatric/Behavioral: Positive for confusion  Negative for suicidal ideas  Physical Exam  Physical Exam  Vitals and nursing note reviewed  Constitutional:       General: She is not in acute distress  Appearance: She is well-developed  She is not diaphoretic  HENT:      Head: Normocephalic and atraumatic  Right Ear: External ear normal       Left Ear: External ear normal       Nose: Nose normal    Eyes:      General: No scleral icterus  Right eye: No discharge  Left eye: No discharge  Extraocular Movements: Extraocular movements intact  Conjunctiva/sclera: Conjunctivae normal       Pupils: Pupils are equal, round, and reactive to light  Neck:      Trachea: No tracheal deviation  Cardiovascular:      Rate and Rhythm: Normal rate and regular rhythm  Heart sounds: Normal heart sounds  No murmur heard  No friction rub  No gallop  Pulmonary:      Effort: Pulmonary effort is normal  No respiratory distress  Breath sounds: Normal breath sounds  No stridor  Chest:      Chest wall: No tenderness  Abdominal:      General: Bowel sounds are normal       Palpations: Abdomen is soft  Tenderness: There is no abdominal tenderness  There is no guarding or rebound  Musculoskeletal:         General: No deformity  Normal range of motion        Cervical back: Normal range of motion and neck supple  No rigidity  Lymphadenopathy:      Cervical: No cervical adenopathy  Skin:     General: Skin is warm and dry  Findings: No rash  Neurological:      Mental Status: She is alert and oriented to person, place, and time  GCS: GCS eye subscore is 4  GCS verbal subscore is 5  GCS motor subscore is 6  Cranial Nerves: No cranial nerve deficit, dysarthria or facial asymmetry  Sensory: No sensory deficit  Motor: No weakness  Coordination: Coordination normal    Psychiatric:         Mood and Affect: Mood is anxious  Affect is tearful  Behavior: Behavior is agitated        Comments: Pt crying, intermittently hysterical           Vital Signs  ED Triage Vitals   Temperature Pulse Respirations Blood Pressure SpO2   06/04/22 0311 06/04/22 0311 06/04/22 0311 06/04/22 0315 06/04/22 0311   97 6 °F (36 4 °C) 88 18 135/90 97 %      Temp Source Heart Rate Source Patient Position - Orthostatic VS BP Location FiO2 (%)   06/04/22 0311 06/04/22 0311 -- -- --   Oral Monitor         Pain Score       06/04/22 0312       No Pain           Vitals:    06/04/22 0330 06/04/22 0345 06/04/22 0400 06/04/22 0430   BP: 131/94   123/84   Pulse: 87 86 81 92         Visual Acuity  Visual Acuity    Flowsheet Row Most Recent Value   L Pupil Size (mm) 4   R Pupil Size (mm) 4          ED Medications  Medications   ondansetron (ZOFRAN) injection 4 mg (4 mg Intravenous Given 6/4/22 0325)   sodium chloride 0 9 % bolus 1,000 mL (0 mL Intravenous Stopped 6/4/22 0427)   LORazepam (ATIVAN) injection 1 mg (1 mg Intravenous Given 6/4/22 0333)       Diagnostic Studies  Results Reviewed     Procedure Component Value Units Date/Time    Urine Microscopic [[id number]]  (Abnormal) Collected: 06/04/22 0511    Lab Status: Final result Specimen: Urine, Clean Catch Updated: 06/04/22 0618     RBC, UA 4-10 /hpf      WBC, UA None Seen /hpf      Epithelial Cells Occasional /hpf      Bacteria, UA Occasional /hpf      MUCUS THREADS None Seen    Rapid drug screen, urine [[id number]]  (Abnormal) Collected: 06/04/22 0511    Lab Status: Final result Specimen: Urine, Clean Catch Updated: 06/04/22 0535     Amph/Meth UR Positive     Barbiturate Ur Negative     Benzodiazepine Urine Negative     Cocaine Urine Negative     Methadone Urine Negative     Opiate Urine Negative     PCP Ur Negative     THC Urine Negative     Oxycodone Urine Negative    Narrative:      FOR MEDICAL PURPOSES ONLY  IF CONFIRMATION NEEDED PLEASE CONTACT THE LAB WITHIN 5 DAYS  Drug Screen Cutoff Levels:  AMPHETAMINE/METHAMPHETAMINES  1000 ng/mL  BARBITURATES     200 ng/mL  BENZODIAZEPINES     200 ng/mL  COCAINE      300 ng/mL  METHADONE      300 ng/mL  OPIATES      300 ng/mL  PHENCYCLIDINE     25 ng/mL  THC       50 ng/mL  OXYCODONE      100 ng/mL    UA w Reflex to Microscopic w Reflex to Culture [[id number]]  (Abnormal) Collected: 06/04/22 0511    Lab Status: Final result Specimen: Urine, Clean Catch Updated: 06/04/22 0534     Color, UA Yellow     Clarity, UA Clear     Specific Gravity, UA <=1 005     pH, UA 6 0     Leukocytes, UA Negative     Nitrite, UA Negative     Protein, UA Negative mg/dl      Glucose, UA Negative mg/dl      Ketones, UA Negative mg/dl      Urobilinogen, UA 0 2 E U /dl      Bilirubin, UA Negative     Blood, UA Moderate    POCT pregnancy, urine [[id number]]  (Normal) Resulted: 06/04/22 0515    Lab Status: Final result Updated: 06/04/22 0515     EXT PREG TEST UR (Ref: Negative) negative     Control Valid    Salicylate level [[id number]]  (Abnormal) Collected: 06/04/22 0325    Lab Status: Final result Specimen: Blood from Arm, Right Updated: 52/32/32 6596     Salicylate Lvl <3 mg/dL     Acetaminophen level-If concentration is detectable, please discuss with medical  on call   [[id number]]  (Abnormal) Collected: 06/04/22 0325    Lab Status: Final result Specimen: Blood from Arm, Right Updated: 06/04/22 6439 Acetaminophen Level <2 0 ug/mL     Comprehensive metabolic panel [[id number]]  (Abnormal) Collected: 06/04/22 0325    Lab Status: Final result Specimen: Blood from Arm, Right Updated: 06/04/22 0445     Sodium 140 mmol/L      Potassium 3 7 mmol/L      Chloride 105 mmol/L      CO2 23 mmol/L      ANION GAP 12 mmol/L      BUN 15 mg/dL      Creatinine 0 76 mg/dL      Glucose 106 mg/dL      Calcium 8 1 mg/dL      Corrected Calcium 8 7 mg/dL      AST 20 U/L      ALT 29 U/L      Alkaline Phosphatase 82 U/L      Total Protein 6 7 g/dL      Albumin 3 3 g/dL      Total Bilirubin 0 10 mg/dL      eGFR 90 ml/min/1 73sq m     Narrative:      [place] guidelines for Chronic Kidney Disease (CKD):     Stage 1 with normal or high GFR (GFR > 90 mL/min/1 73 square meters)    Stage 2 Mild CKD (GFR = 60-89 mL/min/1 73 square meters)    Stage 3A Moderate CKD (GFR = 45-59 mL/min/1 73 square meters)    Stage 3B Moderate CKD (GFR = 30-44 mL/min/1 73 square meters)    Stage 4 Severe CKD (GFR = 15-29 mL/min/1 73 square meters)    Stage 5 End Stage CKD (GFR <15 mL/min/1 73 square meters)  Note: GFR calculation is accurate only with a steady state creatinine    Ethanol [[id number]]  (Abnormal) Collected: 06/04/22 0325    Lab Status: Final result Specimen: Blood from Arm, Right Updated: 06/04/22 0416     Ethanol Lvl 304 mg/dL     Protime-INR [[id number]]  (Normal) Collected: 06/04/22 0325    Lab Status: Final result Specimen: Blood from Arm, Right Updated: 06/04/22 0407     Protime 12 2 seconds      INR 0 91    APTT [[id number]]  (Normal) Collected: 06/04/22 0325    Lab Status: Final result Specimen: Blood from Arm, Right Updated: 06/04/22 0407     PTT 29 seconds     CBC and differential [[id number]]  (Abnormal) Collected: 06/04/22 0325    Lab Status: Final result Specimen: Blood from Arm, Right Updated: 06/04/22 0347     WBC 7 95 Thousand/uL      RBC 4 44 Million/uL      Hemoglobin 12 6 g/dL      Hematocrit 38 0 % MCV 86 fL      MCH 28 4 pg      MCHC 33 2 g/dL      RDW 14 7 %      MPV 8 2 fL      Platelets 151 Thousands/uL      nRBC 0 /100 WBCs      Neutrophils Relative 41 %      Immat GRANS % 0 %      Lymphocytes Relative 44 %      Monocytes Relative 11 %      Eosinophils Relative 3 %      Basophils Relative 1 %      Neutrophils Absolute 3 25 Thousands/µL      Immature Grans Absolute 0 03 Thousand/uL      Lymphocytes Absolute 3 49 Thousands/µL      Monocytes Absolute 0 87 Thousand/µL      Eosinophils Absolute 0 24 Thousand/µL      Basophils Absolute 0 07 Thousands/µL     Fingerstick Glucose (POCT) [[id number]]  (Normal) Collected: 06/04/22 0318    Lab Status: Final result Updated: 06/04/22 0320     POC Glucose 106 mg/dl                  CT head without contrast   Final Result by Magno Lyons MD (06/04 0411)      No acute intracranial abnormality  Workstation performed: [id number]                    Procedures  ECG 12 Lead Documentation Only    Date/Time: 6/4/2022 3:35 AM  Performed by: Jignesh Hauser MD  Authorized by: Jignesh Hauser MD     Indications / Diagnosis:  Ams  ECG reviewed by me, the ED Provider: yes    Patient location:  ED  Previous ECG:     Previous ECG:  Unavailable    Comparison to cardiac monitor: Yes    Interpretation:     Interpretation: non-specific    Rate:     ECG rate:  92    ECG rate assessment: normal    Rhythm:     Rhythm: sinus rhythm    Ectopy:     Ectopy: none    QRS:     QRS axis:  Left    QRS intervals:  Normal  Conduction:     Conduction: normal    ST segments:     ST segments:  Normal  T waves:     T waves: normal    Other findings:     Other findings: prolonged qTc interval               ED Course  ED Course as of 06/04/22 0620   Sat Jun 04, 2022   0619 Pt intoxicated  Now admits to drinking tequila tonight  While sleeping, occasionally does desaturate mid 80s, with no distress   states he thinks she has sleep apnea  Will continue to observe   Once stable to d/c, will do so with outpt resources for mental health/etoh use  MDM  Number of Diagnoses or Management Options  Alcohol intoxication ([facility] [address] ): new and requires workup  Altered mental status: new and requires workup     Amount and/or Complexity of Data Reviewed  Clinical lab tests: ordered and reviewed  Tests in the radiology section of CPT®: ordered and reviewed  Tests in the medicine section of CPT®: ordered and reviewed  Obtain history from someone other than the patient: yes  Review and summarize past medical records: yes  Independent visualization of images, tracings, or specimens: yes    Risk of Complications, Morbidity, and/or Mortality  Presenting problems: moderate  Diagnostic procedures: low  Management options: low    Patient Progress  Patient progress: improved      Disposition  Final diagnoses: Altered mental status   Alcohol intoxication ([facility] [address] )     Time reflects when diagnosis was documented in both MDM as applicable and the Disposition within this note     Time User Action Codes Description Comment    6/4/2022  6:17 AM Toma Garcia Add [R41 82] Altered mental status     6/4/2022  6:17 AM Toma Garcia Add [F10 599] Alcohol intoxication [facility])       ED Disposition     None      Follow-up Information     Follow up With Specialties Details Why Contact Info Additional Information    Arti Castillo DO  Schedule an appointment as soon as possible for a visit   [facility] [address]  Schedule an appointment as soon as possible for a visit   [facility]  [address] [address] Emergency Department Emergency Medicine  If symptoms worsen [address] [place], [id number]  [address] [facility] Emergency Department, [address] [place] [id number]          Patient's Medications   Discharge Prescriptions    No medications on file       No discharge procedures on file      PDMP Review     None          ED Provider  Electronically Signed by           Stefanie Allen MD  06/04/22 7956

## 2022-06-04 NOTE — ED NOTES
Pt spo2 dropped to 85% while on Room air and sleeping, placed back on O2  Provider notified   Plan to trial off O2 again around 1425 Benoit Russo RN  06/04/22 7058

## 2022-06-06 LAB
ATRIAL RATE: 92 BPM
P AXIS: 46 DEGREES
PR INTERVAL: 170 MS
QRS AXIS: -8 DEGREES
QRSD INTERVAL: 74 MS
QT INTERVAL: 412 MS
QTC INTERVAL: 509 MS
T WAVE AXIS: 18 DEGREES
VENTRICULAR RATE: 92 BPM

## 2022-06-06 PROCEDURE — 93010 ELECTROCARDIOGRAM REPORT: CPT | Performed by: INTERNAL MEDICINE

## 2022-08-18 ENCOUNTER — TELEPHONE (OUTPATIENT)
Dept: GASTROENTEROLOGY | Facility: CLINIC | Age: 52
End: 2022-08-18

## 2022-08-18 ENCOUNTER — TELEPHONE (OUTPATIENT)
Dept: CARDIAC SURGERY | Facility: CLINIC | Age: 52
End: 2022-08-18

## 2022-08-18 DIAGNOSIS — K44.9 HIATAL HERNIA: ICD-10-CM

## 2022-08-18 DIAGNOSIS — R13.10 DYSPHAGIA, UNSPECIFIED TYPE: Primary | ICD-10-CM

## 2022-08-18 DIAGNOSIS — K21.00 GASTROESOPHAGEAL REFLUX DISEASE WITH ESOPHAGITIS WITHOUT HEMORRHAGE: ICD-10-CM

## 2022-08-18 NOTE — TELEPHONE ENCOUNTER
Patients GI provider:  Dr Lakeshia Pritchett    Number to return call: [phone]    Reason for call: [facility] Thoracic  surgical called requesting to see if Dr Lakeshia Pritchett can add an order for a Barium Swallow test before they can schedule patient thank you     Scheduled procedure/appointment date if applicable: n/a

## 2022-08-18 NOTE — TELEPHONE ENCOUNTER
Requested referring provider, Dr Kreen George put an order in for a barium swallow so we can schedule her to be seen by thoracic

## 2022-08-19 ENCOUNTER — TELEPHONE (OUTPATIENT)
Dept: CARDIAC SURGERY | Facility: CLINIC | Age: 52
End: 2022-08-19

## 2022-08-19 NOTE — TELEPHONE ENCOUNTER
LVM- a barium swallow has been ordered, please call our office so we can schedule testing and consult with Dr Sedonia Gilford

## 2022-08-19 NOTE — TELEPHONE ENCOUNTER
LVM letting pt know appt date/ time for barium swallow   Please call back to schedule an appt with thoracic for after testing is completed

## 2022-08-23 ENCOUNTER — TELEPHONE (OUTPATIENT)
Dept: CARDIAC SURGERY | Facility: CLINIC | Age: 52
End: 2022-08-23

## 2022-08-23 NOTE — TELEPHONE ENCOUNTER
Pt called in asking if we could move her appt sooner  I explained that we need her to get a barium swallow done before we see her  I gave pt contact number for central scheduling if she would like to see if there have been any cancellations  I did explain that even if she gets her testing done sooner I can not guarantee we will have an earlier appt date for her as we typically book a couple of weeks out  Pt said she is having stomach pain and is concerned that something is going on  I did advise her that if her pain gets worse and she feels she can not wait to be seen she can go to urgent care/ED for more immediate care if she feels it is necessary   Pt will call back if she gets an earlier barium swallow appt to see if we can get her in sooner

## 2022-08-24 DIAGNOSIS — R13.10 DYSPHAGIA, UNSPECIFIED TYPE: ICD-10-CM

## 2022-08-24 DIAGNOSIS — K21.00 GASTROESOPHAGEAL REFLUX DISEASE WITH ESOPHAGITIS WITHOUT HEMORRHAGE: ICD-10-CM

## 2022-08-24 RX ORDER — OMEPRAZOLE 40 MG/1
CAPSULE, DELAYED RELEASE ORAL
Qty: 90 CAPSULE | Refills: 1 | Status: SHIPPED | OUTPATIENT
Start: 2022-08-24

## 2022-09-01 ENCOUNTER — HOSPITAL ENCOUNTER (OUTPATIENT)
Dept: RADIOLOGY | Facility: HOSPITAL | Age: 52
End: 2022-09-01
Payer: COMMERCIAL

## 2022-09-01 DIAGNOSIS — K44.9 HIATAL HERNIA: ICD-10-CM

## 2022-09-01 DIAGNOSIS — K21.00 GASTROESOPHAGEAL REFLUX DISEASE WITH ESOPHAGITIS WITHOUT HEMORRHAGE: ICD-10-CM

## 2022-09-01 DIAGNOSIS — R13.10 DYSPHAGIA, UNSPECIFIED TYPE: ICD-10-CM

## 2022-09-01 PROCEDURE — 74220 X-RAY XM ESOPHAGUS 1CNTRST: CPT

## 2022-09-07 ENCOUNTER — OFFICE VISIT (OUTPATIENT)
Dept: CARDIAC SURGERY | Facility: CLINIC | Age: 52
End: 2022-09-07
Payer: COMMERCIAL

## 2022-09-07 VITALS
RESPIRATION RATE: 17 BRPM | SYSTOLIC BLOOD PRESSURE: 124 MMHG | HEIGHT: 62 IN | WEIGHT: 166.01 LBS | DIASTOLIC BLOOD PRESSURE: 78 MMHG | OXYGEN SATURATION: 98 % | TEMPERATURE: 98.1 F | BODY MASS INDEX: 30.55 KG/M2 | HEART RATE: 74 BPM

## 2022-09-07 DIAGNOSIS — K44.9 PARAESOPHAGEAL HERNIA: Primary | ICD-10-CM

## 2022-09-07 PROCEDURE — 99205 OFFICE O/P NEW HI 60 MIN: CPT | Performed by: THORACIC SURGERY (CARDIOTHORACIC VASCULAR SURGERY)

## 2022-09-07 NOTE — PROGRESS NOTES
Thoracic Consult  Assessment/Plan:   54-year-old female with a moderate type 3 paraesophageal hernia with symptomatic reflux and a peptic stricture    I had a good discussion with Piedad Lopez and her  today about her paraesophageal hernia and associated reflux  They understand this is a structural problem  They understand she can go back on PPIs to control the acid in her stomach but this would not alleviate this problem  I would like to workup more with esophageal manometry and 24 hour pH testing  We will see her back in the office after those to discuss possible surgery  Briefly we discussed surgery being a robotic paraesophageal hernia repair and either partial fundoplication or LINX procedure  Will discuss this more at our next visit    Kianna Traore MD      Diagnoses and all orders for this visit:    Paraesophageal hernia  -     Espoh manometry/24hr ph; Future            Thoracic History     Problem:  Type 3 paraesophageal hernia with GERD symptoms    Procedure:     Pathology:      Subjective:    Patient ID: Derek Najjar is a 45 y o  female  HPI  Piedad Lopez is a 54-year-old female with history asthma, seasonal allergies, and ADHD who we are seeing in consultation for a paraesophageal hernia and symptomatic GERD  Over the past 2-3 years she has had worsening symptoms with GERD  She has been treating this with once daily PPIs  Her symptoms significantly improved with PPIs however she recently underwent dietary changes over the past month and a limited all trigger foods  She is eating much more vegetable based diet  She has since weaned herself off of her PPIs without significant issues  She now has symptoms of heartburn about 1-2 times per week  She denies any regurgitation  No vomiting or issues with food getting stuck    Of note she does have chronic symptoms of asthma  This is been slightly worse over the past 2 years    She thinks this may be secondary to seasonal allergies but does not discount could be from her worsening reflux  She does not wake up with a sour taste in her mouth  Mild chronic cough        The following portions of the patient's history were reviewed and updated as appropriate: allergies, current medications, past family history, past medical history, past social history, past surgical history and problem list     Past Medical History:   Diagnosis Date    Fatigue     Chronic fatigue    GERD (gastroesophageal reflux disease)     Hiatal hernia     Obesity       Past Surgical History:   Procedure Laterality Date    EXPLORATORY LAPAROTOMY        Family History   Problem Relation Age of Onset    No Known Problems Mother     No Known Problems Father     No Known Problems Sister     No Known Problems Brother       Social History     Socioeconomic History    Marital status:      Spouse name: Not on file    Number of children: Not on file    Years of education: Not on file    Highest education level: Not on file   Occupational History    Not on file   Tobacco Use    Smoking status: Never Smoker    Smokeless tobacco: Never Used   Vaping Use    Vaping Use: Never used   Substance and Sexual Activity    Alcohol use: Not Currently    Drug use: Never    Sexual activity: Not on file   Other Topics Concern    Not on file   Social History Narrative    Not on file     Social Determinants of Health     Financial Resource Strain: Not on file   Food Insecurity: Not on file   Transportation Needs: Not on file   Physical Activity: Not on file   Stress: Not on file   Social Connections: Not on file   Intimate Partner Violence: Not on file   Housing Stability: Not on file      Review of Systems   Constitutional: Negative for activity change, appetite change, chills, diaphoresis, fatigue, fever and unexpected weight change  HENT: Negative  Eyes: Negative  Respiratory: Positive for chest tightness  Negative for apnea, cough, shortness of breath, wheezing and stridor  Cardiovascular: Negative for chest pain, palpitations and leg swelling  Gastrointestinal: Positive for abdominal pain  Negative for abdominal distention, blood in stool, constipation, diarrhea, nausea and vomiting  Endocrine: Negative  Genitourinary: Negative  Musculoskeletal: Negative  Skin: Negative  Allergic/Immunologic: Negative  Neurological: Negative  Hematological: Negative  Psychiatric/Behavioral: Negative  Objective:   Physical Exam  Vitals and nursing note reviewed  Constitutional:       General: She is not in acute distress  Appearance: Normal appearance  She is well-developed  She is not diaphoretic  HENT:      Head: Normocephalic and atraumatic  Mouth/Throat:      Pharynx: No oropharyngeal exudate  Eyes:      General: No scleral icterus  Conjunctiva/sclera: Conjunctivae normal       Pupils: Pupils are equal, round, and reactive to light  Neck:      Thyroid: No thyromegaly  Vascular: No JVD  Trachea: No tracheal deviation  Cardiovascular:      Rate and Rhythm: Normal rate and regular rhythm  Heart sounds: Normal heart sounds  No murmur heard  No friction rub  No gallop  Pulmonary:      Effort: Pulmonary effort is normal  No respiratory distress  Breath sounds: Normal breath sounds  No wheezing or rales  Chest:      Chest wall: No tenderness  Abdominal:      General: Bowel sounds are normal  There is no distension  Palpations: Abdomen is soft  There is no mass  Tenderness: There is no abdominal tenderness  There is no guarding or rebound  Musculoskeletal:         General: No tenderness or deformity  Normal range of motion  Cervical back: Normal range of motion and neck supple  Lymphadenopathy:      Cervical: No cervical adenopathy  Skin:     General: Skin is warm and dry  Coloration: Skin is not pale  Findings: No erythema or rash  Neurological:      General: No focal deficit present  Mental Status: She is alert and oriented to person, place, and time  Psychiatric:         Mood and Affect: Mood normal          Behavior: Behavior normal          Thought Content: Thought content normal          Judgment: Judgment normal      /78   Pulse 74   Temp 98 1 °F (36 7 °C)   Resp 17   Ht 5' 2" (1 575 m)   Wt 75 3 kg (166 lb 0 1 oz)   SpO2 98%   BMI 30 36 kg/m²     No Chest XR results available for this patient  No CT Chest results available for this patient  No CT Chest,Abdomen,Pelvis results available for this patient  No NM PET CT results available for this patient  FL barium swallow    Result Date: 9/1/2022  Narrative BARIUM SWALLOW-ESOPHAGRAM INDICATION:   R13 10: Dysphagia, unspecified K44 9: Diaphragmatic hernia without obstruction or gangrene K21 00: Gastro-esophageal reflux disease with esophagitis, without bleeding  Esophageal stricture COMPARISON:  Results of endoscopy September 2021 IMAGES:  61 FLUOROSCOPY TIME:   1 5minutes  TECHNIQUE: The patient was given effervescent granules and barium by mouth and images of the esophagus were obtained  FINDINGS: There is normal distensibility of the proximal and mid esophagus with mild narrowing of the distal esophagus which does not persist on additional images  Esophageal dysmotility is demonstrated with tertiary esophageal contractions  No mucosal lesion, ulceration or evidence of fold thickening is seen  Gastroesophageal reflux was noted spontaneously to the level of the proximal esophagus Large hiatal hernia is noted  Results were discussed with the patient at completion of the exam     Impression Large hiatal hernia and spontaneous reflux to the proximal esophagus  Esophageal dysmotility with mild narrowing at the gastroesophageal junction without a fixed obstruction  The study was marked in EPIC for significant notification  Workstation performed: [id number]     I reviewed her EGD from 09/09/2021 with her    She has a moderate 5 cm hiatal hernia with the GE junction at 31 cm from the incisors  There was evidence of a peptic stricture at this area that was dilated to 15 mm  There is mucosal tear and significant inflammation  I reviewed her barium swallow from 09/01/2022  This shows the GE junction portion of the fundus above the diaphragm consistent with a type 3 paraesophageal hernia  There is some mild evidence of esophageal dysmotility with narrowing at the GE junction        Roxanne Lau MD  Thoracic Surgeon    (Available by [facility] Text)

## 2022-09-20 ENCOUNTER — TELEPHONE (OUTPATIENT)
Dept: GASTROENTEROLOGY | Facility: HOSPITAL | Age: 52
End: 2022-09-20

## 2022-09-20 PROBLEM — F90.2 ATTENTION DEFICIT HYPERACTIVITY DISORDER, COMBINED TYPE: Status: ACTIVE | Noted: 2022-09-20

## 2022-09-22 ENCOUNTER — TELEMEDICINE (OUTPATIENT)
Dept: PSYCHIATRY | Facility: CLINIC | Age: 52
End: 2022-09-22
Payer: COMMERCIAL

## 2022-09-22 ENCOUNTER — HOSPITAL ENCOUNTER (OUTPATIENT)
Dept: GASTROENTEROLOGY | Facility: HOSPITAL | Age: 52
End: 2022-09-22
Attending: THORACIC SURGERY (CARDIOTHORACIC VASCULAR SURGERY)
Payer: COMMERCIAL

## 2022-09-22 VITALS
HEART RATE: 102 BPM | TEMPERATURE: 98.6 F | SYSTOLIC BLOOD PRESSURE: 133 MMHG | RESPIRATION RATE: 18 BRPM | OXYGEN SATURATION: 98 % | DIASTOLIC BLOOD PRESSURE: 100 MMHG

## 2022-09-22 DIAGNOSIS — F90.2 ATTENTION DEFICIT HYPERACTIVITY DISORDER, COMBINED TYPE: Primary | ICD-10-CM

## 2022-09-22 DIAGNOSIS — K44.9 PARAESOPHAGEAL HERNIA: ICD-10-CM

## 2022-09-22 PROCEDURE — 91010 ESOPHAGUS MOTILITY STUDY: CPT | Performed by: INTERNAL MEDICINE

## 2022-09-22 PROCEDURE — 91038 ESOPH IMPED FUNCT TEST > 1HR: CPT | Performed by: INTERNAL MEDICINE

## 2022-09-22 PROCEDURE — 99213 OFFICE O/P EST LOW 20 MIN: CPT | Performed by: NURSE PRACTITIONER

## 2022-09-22 PROCEDURE — 91038 ESOPH IMPED FUNCT TEST > 1HR: CPT

## 2022-09-22 PROCEDURE — 91020 GASTRIC MOTILITY STUDIES: CPT

## 2022-09-22 RX ORDER — DEXTROAMPHETAMINE SACCHARATE, AMPHETAMINE ASPARTATE MONOHYDRATE, DEXTROAMPHETAMINE SULFATE AND AMPHETAMINE SULFATE 5; 5; 5; 5 MG/1; MG/1; MG/1; MG/1
CAPSULE, EXTENDED RELEASE ORAL
Qty: 30 CAPSULE | Refills: 0 | Status: SHIPPED | OUTPATIENT
Start: 2022-09-22

## 2022-09-22 RX ORDER — DEXTROAMPHETAMINE SACCHARATE, AMPHETAMINE ASPARTATE, DEXTROAMPHETAMINE SULFATE AND AMPHETAMINE SULFATE 2.5; 2.5; 2.5; 2.5 MG/1; MG/1; MG/1; MG/1
TABLET ORAL
Qty: 30 TABLET | Refills: 0 | Status: SHIPPED | OUTPATIENT
Start: 2022-09-22

## 2022-09-22 NOTE — BH TREATMENT PLAN
TREATMENT PLAN (Medication Management Only)        [facility]    Name and Date of Birth:  Claudean Gram 45 y o  1970  Date of Treatment Plan: September 22, 2022  Diagnosis/Diagnoses:    1  Attention deficit hyperactivity disorder, combined type      Strengths/Personal Resources for Self-Care: supportive family, supportive friends  Area/Areas of need (in own words): ADHD symptoms  1  Long Term Goal: maintain ADHD symptoms  Target Date:6 months - 3/22/2023  Person/Persons responsible for completion of goal: Uziel Kauffman  2  Short Term Objective (s) - How will we reach this goal?:   A  Provider new recommended medication/dosage changes and/or continue medication(s): continue current medications as prescribed Adderall, Adderall XR   B  N/A   C  N/A  Target Date:6 months - 3/22/2023  Person/Persons Responsible for Completion of Goal: Uziel Kauffman  Progress Towards Goals: stable  Treatment Modality: medication management every 3 months  Review due 180 days from date of this plan: 6 months - 3/22/2023  Expected length of service: maintenance  My Physician/PA/NP and I have developed this plan together and I agree to work on the goals and objectives  I understand the treatment goals that were developed for my treatment

## 2022-09-22 NOTE — PSYCH
Virtual Regular Visit    Verification of patient location:At home    Patient is located in the following state in which I hold an active license [place]      Assessment/Plan:       Diagnoses and all orders for this visit:    Attention deficit hyperactivity disorder, combined type  -     amphetamine-dextroamphetamine (ADDERALL) 10 mg tablet; Take 1 PO @ 3PM  -     amphetamine-dextroamphetamine (ADDERALL XR) 20 MG 24 hr capsule; Take 1 PO QD  -     amphetamine-dextroamphetamine (ADDERALL XR, 20MG,) 20 MG 24 hr capsule; Take 1 PO QD  -     amphetamine-dextroamphetamine (ADDERALL XR, 20MG,) 20 MG 24 hr capsule; Take 1 PO QD  -     amphetamine-dextroamphetamine (ADDERALL, 10MG,) 10 mg tablet; Take 1 PO @ 3 PM  -     amphetamine-dextroamphetamine (ADDERALL, 10MG,) 10 mg tablet; Take 1 PO @ 3 PM          Goals addressed in session:   Good Health  Working on personal goals  Counseling provided:      Treatment Recommendations- Risks Benefits       Immediate Medical/Psychiatric/Psychotherapy Treatments and Any Precautions:     Risks, Benefits And Possible Side Effects Of Medications:  Risks, benefits, and possible side effects of medications explained to patient and patient verbalizes understanding    Controlled Medication Discussion: No records found for controlled prescriptions according to [place] Prescription Drug Monitoring Program      Reason for visit is No chief complaint on file  Medication Management     Encounter provider TEENA Whyte    Provider [place] at [address] [place] [address]  [phone]      Recent Visits  No visits were found meeting these conditions    Showing recent visits within past 7 days and meeting all other requirements  Today's Visits  Date Type Provider Dept   09[facility]22/[facility] and meeting all other requirements  Future Appointments  No visits were found meeting these conditions  Showing future appointments within next 150 days and meeting all other requirements       The patient was identified by name and date of birth  Joaquin Santana was informed that this is a telemedicine visit and that [facility] and patient was informed that this is a secure, HIPAA-compliant platform  She agrees to proceed     My office door was closed  No one else was in the room  She acknowledged consent and understanding of privacy and security of the video platform  The patient has agreed to participate and understands they can discontinue the visit at any time  Patient is aware this is a billable service  Subjective    Joaquin Santana is a 45 y o  female    here today for a med check   This was done over Amwell and phne as I could not hear her over Amwell    normal appetite      HPI  Mood good  Denies anxiety  Focus good  Has done the things that she wanted to do  No problems with medication  Appetite Sleep good  Health OK    Past Medical History:   Diagnosis Date    Fatigue     Chronic fatigue    GERD (gastroesophageal reflux disease)     Hiatal hernia     Obesity        Past Surgical History:   Procedure Laterality Date    EXPLORATORY LAPAROTOMY         Current Outpatient Medications   Medication Sig Dispense Refill    amphetamine-dextroamphetamine (ADDERALL XR) 20 MG 24 hr capsule Take 1 PO QD 30 capsule 0    amphetamine-dextroamphetamine (ADDERALL XR, 20MG,) 20 MG 24 hr capsule Take 1 PO QD 30 capsule 0    amphetamine-dextroamphetamine (ADDERALL XR, 20MG,) 20 MG 24 hr capsule Take 1 PO QD 30 capsule 0    amphetamine-dextroamphetamine (ADDERALL) 10 mg tablet Take 1 PO @ 3PM 30 tablet 0    amphetamine-dextroamphetamine (ADDERALL, 10MG,) 10 mg tablet Take 1 PO @ 3 PM 30 tablet 0    amphetamine-dextroamphetamine (ADDERALL, 10MG,) 10 mg tablet Take 1 PO @ 3 PM 30 tablet 0    albuterol (PROVENTIL HFA,VENTOLIN HFA) 90 mcg/act inhaler inhale 2 puffs every 4 to 6 hours if needed for wheezing      fexofenadine (ALLEGRA) 180 MG tablet Take 180 mg by mouth daily (Patient not taking: Reported on 9/7/2022)      Flovent  MCG/ACT inhaler Inhale 1 puff 2 (two) times a day (Patient not taking: Reported on 9/7/2022)      fluticasone (FLONASE) 50 mcg/act nasal spray 1 spray 2 (two) times a day (Patient not taking: Reported on 9/7/2022)      omeprazole (PriLOSEC) 40 MG capsule take 1 capsule by mouth daily (Patient not taking: Reported on 9/7/2022) 90 capsule 1    ranitidine (ZANTAC) 150 mg tablet Take 150 mg by mouth 2 (two) times a day (Patient not taking: No sig reported)       No current facility-administered medications for this visit          Allergies   Allergen Reactions    Penicillins        Social History     Substance and Sexual Activity   Drug Use Never       Family History   Problem Relation Age of Onset    No Known Problems Mother     No Known Problems Father     No Known Problems Sister     No Known Problems Brother            Objective    Mental status:  Appearance calm and cooperative  and adequate hygiene and grooming   Mood mood appropriate   Affect affect appropriate    Speech a normal rate and fluent   Thought Processes normal thought processes   Hallucinations no hallucinations present    Thought Content no delusions   Abnormal Thoughts no suicidal thoughts  and no homicidal thoughts    Orientation  oriented to person and place and time   Remote Memory short term memory intact   Attention Span concentration intact   Intellect Appears to be of Average Intelligence   Insight Insight intact   Judgement judgment was intact   Muscle Strength Muscle strength and tone were normal and Normal gait    Language no difficulty naming common objects   Fund of Knowledge displays adequate knowledge of current events   Pain none   Pain Scale 0       Video Exam    There were no vitals filed for this visit     I spent 20 minutes directly with the patient during this visit    Patient Instructions   Continue Current Tx  Work  Report Problems  Return 3 months

## 2022-09-22 NOTE — PERIOPERATIVE NURSING NOTE
Patient brought in the room and educated on procedure  Lidocaine 2% topical solution inserted via nostrils  Manometry catheter placed via right nostril and secured  10 liquid swallows, 10 viscous swallow, 1 rapid swallow and 1 rapid drink challenge with 200 cc of water performed  Patient tolerated procedure and catheter removed intact  PH probe inserted via the right nostril and secured  Zephr recorder teach back performed and patient verbalized understanding  Patient instructed to return next day to have probe removed  Discharge instructions given and patient ambulated out of the room in stable condition

## 2022-10-05 ENCOUNTER — OFFICE VISIT (OUTPATIENT)
Dept: CARDIAC SURGERY | Facility: CLINIC | Age: 52
End: 2022-10-05
Payer: COMMERCIAL

## 2022-10-05 VITALS
HEIGHT: 62 IN | BODY MASS INDEX: 30.02 KG/M2 | OXYGEN SATURATION: 99 % | RESPIRATION RATE: 17 BRPM | SYSTOLIC BLOOD PRESSURE: 115 MMHG | DIASTOLIC BLOOD PRESSURE: 78 MMHG | HEART RATE: 88 BPM | TEMPERATURE: 98.4 F | WEIGHT: 163.14 LBS

## 2022-10-05 DIAGNOSIS — K21.9 GASTROESOPHAGEAL REFLUX DISEASE, UNSPECIFIED WHETHER ESOPHAGITIS PRESENT: Primary | ICD-10-CM

## 2022-10-05 DIAGNOSIS — K44.9 PARAESOPHAGEAL HERNIA: ICD-10-CM

## 2022-10-05 PROCEDURE — 99214 OFFICE O/P EST MOD 30 MIN: CPT | Performed by: THORACIC SURGERY (CARDIOTHORACIC VASCULAR SURGERY)

## 2022-10-05 RX ORDER — CEFAZOLIN SODIUM 2 G/50ML
2000 SOLUTION INTRAVENOUS ONCE
OUTPATIENT
Start: 2022-10-05 | End: 2022-10-05

## 2022-10-05 RX ORDER — METRONIDAZOLE 500 MG/100ML
500 INJECTION, SOLUTION INTRAVENOUS ONCE
OUTPATIENT
Start: 2022-10-05 | End: 2022-10-05

## 2022-10-05 NOTE — ASSESSMENT & PLAN NOTE
Ms Conner Kothari has a symptomatic type 3 paraesophageal hernia with heartburn and mild dysphagia  Recent manometry demonstrated 7/10 normal swallows and two were failed swallows  The hernia measured 7cm  Her DeMeester score is significantly elevated at 37  We discussed surgery involving a robotic PEHR and fundoplication versus Linx device placement  Both options were discussed fully  Given her not totally normal esophageal motility, lack of regurgitant symptoms, and mild dysphagia, Linx is not the superior option  We recommended a partial fundoplication; after a discussion, patient would not like a fundoplication  She would like to proceed with robotic PEHR, possible mesh, EGD, and gastropexy  Consent obtained after risks discussed

## 2022-10-05 NOTE — PROGRESS NOTES
Assessment/Plan:    Paraesophageal hernia  Ms  Artcynthia Pina has a symptomatic type 3 paraesophageal hernia with heartburn and mild dysphagia  Recent manometry demonstrated 7/10 normal swallows and two were failed swallows  The hernia measured 7cm  Her DeMeester score is significantly elevated at 37  We discussed surgery involving a robotic PEHR and fundoplication versus Linx device placement  Both options were discussed fully  Given her not totally normal esophageal motility, lack of regurgitant symptoms, and mild dysphagia, Linx is not the superior option  We recommended a partial fundoplication; after a discussion, patient would not like a fundoplication  She would like to proceed with robotic PEHR, possible mesh, EGD, and gastropexy  Consent obtained after risks discussed  Diagnoses and all orders for this visit:    Gastroesophageal reflux disease, unspecified whether esophagitis present    Paraesophageal hernia  -     Type and screen; Future  -     Basic metabolic panel; Future  -     CBC and Platelet; Future  -     APTT; Future  -     Protime-INR; Future  -     Case request operating room: REPAIR HERNIA PARAESOPHAGEAL LAPAROSCOPIC W ROBOTICS, GASTROPEXY, ESOPHAGOGASTRODUODENOSCOPY (EGD); Standing  -     PAT Covid Screening; Future  -     COVID only; Future  -     Type and screen  -     Basic metabolic panel  -     CBC and Platelet  -     APTT  -     Protime-INR  -     Case request operating room: REPAIR HERNIA PARAESOPHAGEAL LAPAROSCOPIC W ROBOTICS, GASTROPEXY, ESOPHAGOGASTRODUODENOSCOPY (EGD)    Other orders  -     Diet NPO; Sips with meds; Standing  -     Void on call to OR; Standing  -     Insert peripheral IV; Standing  -     Place sequential compression device; Standing  -     ceFAZolin (ANCEF) IVPB (premix in dextrose) 2,000 mg 50 mL  -     metroNIDAZOLE (FLAGYL) IVPB (premix) 500 mg 100 mL          Thoracic History          Subjective:    Patient ID: Prasanna Marshall is a 45 y o  female  HPI   Ms   Artcynthia Pina is a 45year old female who presents after undergoing manometry for work up of her paraesophageal hernia  Barium swallow 9/1/22 revealed a large hiatal hernia and spontaneous reflux to proximal esophagus  There was evidence of esophageal dysmotility  Manometry 9/22/22 shows 7/10 swallows normal with mean   2 swallows were failed  Median IRP was normal, and the hernia measured 6 8cm  Her demeester score is 37 without significant symptom correlation  There were 64 episodes of reflux overall, 32 upright and 32 recumbent which were mostly acidic  On discussion, she reports heartburn and pain almost every time she eats at this point  She is developing mild dysphagia and needs water nearby with meals to help get things down  No regurgitation  The following portions of the patient's history were reviewed and updated as appropriate: allergies, current medications, past family history, past medical history, past social history, past surgical history and problem list     Review of Systems   Constitutional: Positive for appetite change  HENT: Positive for trouble swallowing  Respiratory: Positive for wheezing (asthma)  Cardiovascular: Positive for chest pain (heartburn)  Gastrointestinal:        +heartburn   Allergic/Immunologic: Positive for environmental allergies  All other systems reviewed and are negative  Objective:   Physical Exam  Vitals reviewed  Constitutional:       General: She is not in acute distress  Appearance: Normal appearance  She is well-developed  She is not diaphoretic  HENT:      Head: Normocephalic and atraumatic  Mouth/Throat:      Comments: Masked   Eyes:      General: No scleral icterus  Extraocular Movements: Extraocular movements intact  Neck:      Trachea: No tracheal deviation  Cardiovascular:      Rate and Rhythm: Normal rate and regular rhythm  Pulses: Normal pulses  Heart sounds: Normal heart sounds   No murmur heard   Pulmonary:      Effort: Pulmonary effort is normal  No respiratory distress  Breath sounds: Normal breath sounds  No wheezing  Abdominal:      General: Bowel sounds are normal  There is no distension  Palpations: Abdomen is soft  Musculoskeletal:         General: Normal range of motion  Cervical back: Normal range of motion and neck supple  Right lower leg: No edema  Left lower leg: No edema  Lymphadenopathy:      Cervical: No cervical adenopathy  Skin:     General: Skin is warm and dry  Findings: No erythema  Neurological:      Mental Status: She is alert and oriented to person, place, and time  Cranial Nerves: No cranial nerve deficit  Psychiatric:         Mood and Affect: Mood normal          Behavior: Behavior normal          Thought Content: Thought content normal      /78   Pulse 88   Temp 98 4 °F (36 9 °C)   Resp 17   Ht 5' 2" (1 575 m)   Wt 74 kg (163 lb 2 3 oz)   SpO2 99%   BMI 29 84 kg/m²       FL barium swallow    Result Date: 9/1/2022  Narrative BARIUM SWALLOW-ESOPHAGRAM INDICATION:   R13 10: Dysphagia, unspecified K44 9: Diaphragmatic hernia without obstruction or gangrene K21 00: Gastro-esophageal reflux disease with esophagitis, without bleeding  Esophageal stricture COMPARISON:  Results of endoscopy September 2021 IMAGES:  61 FLUOROSCOPY TIME:   1 5minutes  TECHNIQUE: The patient was given effervescent granules and barium by mouth and images of the esophagus were obtained  FINDINGS: There is normal distensibility of the proximal and mid esophagus with mild narrowing of the distal esophagus which does not persist on additional images  Esophageal dysmotility is demonstrated with tertiary esophageal contractions  No mucosal lesion, ulceration or evidence of fold thickening is seen  Gastroesophageal reflux was noted spontaneously to the level of the proximal esophagus Large hiatal hernia is noted   Results were discussed with the patient at completion of the exam     Impression Large hiatal hernia and spontaneous reflux to the proximal esophagus  Esophageal dysmotility with mild narrowing at the gastroesophageal junction without a fixed obstruction  The study was marked in EPIC for significant notification   Workstation performed: [id number]

## 2022-10-07 LAB
ABO GROUP BLD: NORMAL
APTT PPP: 30 SEC (ref 24–33)
BLD GP AB SCN SERPL QL: NEGATIVE
BUN SERPL-MCNC: 9 MG/DL (ref 6–24)
BUN/CREAT SERPL: 10 (ref 9–23)
CALCIUM SERPL-MCNC: 9 MG/DL (ref 8.7–10.2)
CHLORIDE SERPL-SCNC: 104 MMOL/L (ref 96–106)
CO2 SERPL-SCNC: 22 MMOL/L (ref 20–29)
CREAT SERPL-MCNC: 0.88 MG/DL (ref 0.57–1)
EGFR: 79 ML/MIN/1.73
ERYTHROCYTE [DISTWIDTH] IN BLOOD BY AUTOMATED COUNT: 14.4 % (ref 11.7–15.4)
GLUCOSE SERPL-MCNC: 81 MG/DL (ref 70–99)
HCT VFR BLD AUTO: 40.3 % (ref 34–46.6)
HGB BLD-MCNC: 13.6 G/DL (ref 11.1–15.9)
INR PPP: 0.9 (ref 0.9–1.2)
MCH RBC QN AUTO: 29.2 PG (ref 26.6–33)
MCHC RBC AUTO-ENTMCNC: 33.7 G/DL (ref 31.5–35.7)
MCV RBC AUTO: 87 FL (ref 79–97)
PLATELET # BLD AUTO: 383 X10E3/UL (ref 150–450)
POTASSIUM SERPL-SCNC: 4.7 MMOL/L (ref 3.5–5.2)
PROTHROMBIN TIME: 9.8 SEC (ref 9.1–12)
RBC # BLD AUTO: 4.66 X10E6/UL (ref 3.77–5.28)
RH BLD: POSITIVE
SODIUM SERPL-SCNC: 138 MMOL/L (ref 134–144)
WBC # BLD AUTO: 4.8 X10E3/UL (ref 3.4–10.8)

## 2022-10-10 ENCOUNTER — TELEPHONE (OUTPATIENT)
Dept: GYNECOLOGIC ONCOLOGY | Facility: CLINIC | Age: 52
End: 2022-10-10

## 2022-10-10 NOTE — TELEPHONE ENCOUNTER
Patient called in to r/s the date of her surgery with Dr Edy Mendez Patient is currently scheduled for surgery for 10/21/22  Patient would like to change this   Patient can be reached back @ [phone]

## 2022-10-12 ENCOUNTER — TELEPHONE (OUTPATIENT)
Dept: CARDIAC SURGERY | Facility: CLINIC | Age: 52
End: 2022-10-12

## 2022-10-13 ENCOUNTER — TELEPHONE (OUTPATIENT)
Dept: CARDIAC SURGERY | Facility: CLINIC | Age: 52
End: 2022-10-13

## 2022-10-13 NOTE — TELEPHONE ENCOUNTER
I called and LVM for Cally Zhang as well as Katharine Titus listed as her contact  I have been trying to get in touch with pt in regards to her calling in several days ago saying she wants to r/s her surgery   I do need to discuss this with the pt so I know whether or not to cancel her upcoming surgery for the 21st

## 2022-10-17 ENCOUNTER — TELEPHONE (OUTPATIENT)
Dept: PSYCHIATRY | Facility: CLINIC | Age: 52
End: 2022-10-17

## 2022-10-17 NOTE — TELEPHONE ENCOUNTER
Yobanyr outreached to Patient to schedule a follow-up appointment from their appointment on 9/22 with Anatoliy Penny wanted patient to follow-up in 3m  Message was left   Requested a call back

## 2022-12-01 ENCOUNTER — TELEMEDICINE (OUTPATIENT)
Dept: PSYCHIATRY | Facility: CLINIC | Age: 52
End: 2022-12-01

## 2022-12-01 DIAGNOSIS — F90.2 ATTENTION DEFICIT HYPERACTIVITY DISORDER, COMBINED TYPE: Primary | ICD-10-CM

## 2022-12-01 RX ORDER — DEXTROAMPHETAMINE SACCHARATE, AMPHETAMINE ASPARTATE, DEXTROAMPHETAMINE SULFATE AND AMPHETAMINE SULFATE 2.5; 2.5; 2.5; 2.5 MG/1; MG/1; MG/1; MG/1
TABLET ORAL
Qty: 30 TABLET | Refills: 0 | Status: SHIPPED | OUTPATIENT
Start: 2022-12-01

## 2022-12-01 RX ORDER — DEXTROAMPHETAMINE SACCHARATE, AMPHETAMINE ASPARTATE MONOHYDRATE, DEXTROAMPHETAMINE SULFATE AND AMPHETAMINE SULFATE 5; 5; 5; 5 MG/1; MG/1; MG/1; MG/1
CAPSULE, EXTENDED RELEASE ORAL
Qty: 30 CAPSULE | Refills: 0 | Status: SHIPPED | OUTPATIENT
Start: 2022-12-01

## 2022-12-01 NOTE — PSYCH
Virtual Regular Visit    Verification of patient location: at home    Patient is located in the following state in which I hold an active license [place]      Assessment/Plan:       Diagnoses and all orders for this visit:    Attention deficit hyperactivity disorder, combined type  -     amphetamine-dextroamphetamine (ADDERALL XR) 20 MG 24 hr capsule; Take 1 PO QD  -     amphetamine-dextroamphetamine (ADDERALL XR, 20MG,) 20 MG 24 hr capsule; Take 1 PO QD  -     amphetamine-dextroamphetamine (ADDERALL XR, 20MG,) 20 MG 24 hr capsule; Take 1 PO QD  -     amphetamine-dextroamphetamine (ADDERALL) 10 mg tablet; Take 1 PO @ 3PM  -     amphetamine-dextroamphetamine (ADDERALL, 10MG,) 10 mg tablet; Take 1 PO @ 3 PM  -     amphetamine-dextroamphetamine (ADDERALL, 10MG,) 10 mg tablet; Take 1 PO @ 3 PM          Goals addressed in session:   Good health  Counseling provided:      Treatment Recommendations- Risks Benefits       Immediate Medical/Psychiatric/Psychotherapy Treatments and Any Precautions:     Risks, Benefits And Possible Side Effects Of Medications:  Risks, benefits, and possible side effects of medications explained to patient and patient verbalizes understanding    Controlled Medication Discussion: The patient has been filling controlled prescriptions on time as prescribed to [facility] program      Reason for visit is No chief complaint on file  Medication Management     Encounter provider TEENA Arias    Provider [place] at [address] [place] [address]  [phone]      Recent Visits  No visits were found meeting these conditions    Showing recent visits within past 7 days and meeting all other requirements  Today's Visits  Date Type Provider Dept   12/01/22 Telemedicine Denisha Martinez [facility] today's visits and meeting all other requirements  Future Appointments  No visits were found meeting these conditions  Showing future appointments within next 150 days and meeting all other requirements       The patient was identified by name and date of birth  Zane Quinones was informed that this is a telemedicine visit and that the visit is being conducted throughthe [employer] platform  She agrees to proceed     My office door was closed  No one else was in the room  She acknowledged consent and understanding of privacy and security of the video platform  The patient has agreed to participate and understands they can discontinue the visit at any time  Patient is aware this is a billable service  Subjective    Zane Quinones is a 47 y o  female    Here today for a med check  This was via [facility] [address] [facility]    normal appetite      HPI Mood good  Work is [employer] "very good"  Doing medication and self hypnosis  No problems with medication  Appetite Sleep good  Just Dx with Arthritis  Denies SI/HI    Past Medical History:   Diagnosis Date   • Fatigue     Chronic fatigue   • GERD (gastroesophageal reflux disease)    • Hiatal hernia    • Obesity        Past Surgical History:   Procedure Laterality Date   • EXPLORATORY LAPAROTOMY         Current Outpatient Medications   Medication Sig Dispense Refill   • amphetamine-dextroamphetamine (ADDERALL XR) 20 MG 24 hr capsule Take 1 PO QD 30 capsule 0   • amphetamine-dextroamphetamine (ADDERALL XR, 20MG,) 20 MG 24 hr capsule Take 1 PO QD 30 capsule 0   • amphetamine-dextroamphetamine (ADDERALL XR, 20MG,) 20 MG 24 hr capsule Take 1 PO QD 30 capsule 0   • amphetamine-dextroamphetamine (ADDERALL) 10 mg tablet Take 1 PO @ 3PM 30 tablet 0   • amphetamine-dextroamphetamine (ADDERALL, 10MG,) 10 mg tablet Take 1 PO @ 3 PM 30 tablet 0   • amphetamine-dextroamphetamine (ADDERALL, 10MG,) 10 mg tablet Take 1 PO @ 3 PM 30 tablet 0   • albuterol (PROVENTIL HFA,VENTOLIN HFA) 90 mcg/act inhaler inhale 2 puffs every 4 to 6 hours if needed for wheezing • fexofenadine (ALLEGRA) 180 MG tablet Take 180 mg by mouth daily     • Flovent  MCG/ACT inhaler Inhale 1 puff 2 (two) times a day     • fluticasone (FLONASE) 50 mcg/act nasal spray 1 spray 2 (two) times a day     • omeprazole (PriLOSEC) 40 MG capsule take 1 capsule by mouth daily 90 capsule 1   • ranitidine (ZANTAC) 150 mg tablet Take 150 mg by mouth 2 (two) times a day       No current facility-administered medications for this visit  Allergies   Allergen Reactions   • Penicillins        Social History     Substance and Sexual Activity   Drug Use Never       Family History   Problem Relation Age of Onset   • No Known Problems Mother    • No Known Problems Father    • No Known Problems Sister    • No Known Problems Brother            Objective    Mental status:  Appearance calm and cooperative    Mood mood appropriate   Affect affect appropriate    Speech a normal rate and fluent   Thought Processes normal thought processes   Hallucinations no hallucinations present    Thought Content no delusions   Abnormal Thoughts no suicidal thoughts  and no homicidal thoughts    Orientation  oriented to person and place and time   Remote Memory short term memory intact and long term memory intact   Attention Span concentration intact   Intellect Appears to be of Average Intelligence   Insight Insight intact   Judgement judgment was intact   Muscle Strength Muscle strength and tone were normal and Normal gait    Language no difficulty naming common objects   Fund of Knowledge displays adequate knowledge of current events   Pain none   Pain Scale 0       Video Exam    There were no vitals filed for this visit      I spent 15 minutes directly with the patient during this visit    Patient Instructions   Continue Current Tx  Report Problems  Work  Return 3 months       Visit Time    Visit Start Time: 0823  Visit Stop Time: 5032  Total Visit Duration: 17 min

## 2022-12-01 NOTE — BH TREATMENT PLAN
TREATMENT PLAN (Medication Management Only)        [facility]    Name and Date of Birth:  Zane Avila 47 y o  1970  Date of Treatment Plan: December 1, 2022  Diagnosis/Diagnoses:    1  Attention deficit hyperactivity disorder, combined type      Strengths/Personal Resources for Self-Care: supportive family, supportive friends, taking medications as prescribed, ability to adapt to life changes, ability to communicate needs, ability to communicate well, ability to listen, ability to reason  Area/Areas of need (in own words): ADHD symptoms  1  Long Term Goal: maintain ADHD symptoms  Target Date:6 months - 6/1/2023  Person/Persons responsible for completion of goal: Jaylin Farr  2  Short Term Objective (s) - How will we reach this goal?:   A  Provider new recommended medication/dosage changes and/or continue medication(s): continue current medications as prescribed Adderall, Adderall XR   B  N/A   C  N/A  Target Date:6 months - 6/1/2023  Person/Persons Responsible for Completion of Goal: Jaylin Farr  Progress Towards Goals: stable  Treatment Modality: medication management every 3 months  Review due 180 days from date of this plan: 6 months - 6/1/2023  Expected length of service: maintenance  My Physician/PA/NP and I have developed this plan together and I agree to work on the goals and objectives  I understand the treatment goals that were developed for my treatment

## 2022-12-19 ENCOUNTER — TELEPHONE (OUTPATIENT)
Dept: GYNECOLOGIC ONCOLOGY | Facility: CLINIC | Age: 52
End: 2022-12-19

## 2022-12-19 NOTE — TELEPHONE ENCOUNTER
Patient called into the office to cancel her f/u appointment with Dr Diego Meraz due to family issues  Patient states that she will call back to reschedule at a later time

## 2023-02-20 ENCOUNTER — TELEPHONE (OUTPATIENT)
Dept: PSYCHIATRY | Facility: CLINIC | Age: 53
End: 2023-02-20

## 2023-02-20 NOTE — TELEPHONE ENCOUNTER
PA request rec'd for Adderall XR  Processed and received response stating there was already an active PA on file for that medication  Per the PDMP that medication was just filled on 1/31

## 2023-03-01 ENCOUNTER — TELEMEDICINE (OUTPATIENT)
Dept: PSYCHIATRY | Facility: CLINIC | Age: 53
End: 2023-03-01

## 2023-03-01 DIAGNOSIS — F90.2 ATTENTION DEFICIT HYPERACTIVITY DISORDER, COMBINED TYPE: ICD-10-CM

## 2023-03-01 RX ORDER — DEXTROAMPHETAMINE SACCHARATE, AMPHETAMINE ASPARTATE MONOHYDRATE, DEXTROAMPHETAMINE SULFATE AND AMPHETAMINE SULFATE 5; 5; 5; 5 MG/1; MG/1; MG/1; MG/1
CAPSULE, EXTENDED RELEASE ORAL
Qty: 30 CAPSULE | Refills: 0 | Status: SHIPPED | OUTPATIENT
Start: 2023-03-01

## 2023-03-01 RX ORDER — DEXTROAMPHETAMINE SACCHARATE, AMPHETAMINE ASPARTATE, DEXTROAMPHETAMINE SULFATE AND AMPHETAMINE SULFATE 2.5; 2.5; 2.5; 2.5 MG/1; MG/1; MG/1; MG/1
TABLET ORAL
Qty: 30 TABLET | Refills: 0 | Status: SHIPPED | OUTPATIENT
Start: 2023-03-01

## 2023-03-01 NOTE — PSYCH
Virtual Regular Visit    Verification of patient location: at home    Patient is located in the following state in which I hold an [place] license [place]      Assessment/Plan:       Diagnoses and all orders for this visit:    Attention deficit hyperactivity disorder, combined type  -     amphetamine-dextroamphetamine (ADDERALL XR) 20 MG 24 hr capsule; Take 1 PO QD  -     amphetamine-dextroamphetamine (ADDERALL XR, 20MG,) 20 MG 24 hr capsule; Take 1 PO QD  -     amphetamine-dextroamphetamine (ADDERALL XR, 20MG,) 20 MG 24 hr capsule; Take 1 PO QD  -     amphetamine-dextroamphetamine (ADDERALL) 10 mg tablet; Take 1 PO @ 3PM  -     amphetamine-dextroamphetamine (ADDERALL, 10MG,) 10 mg tablet; Take 1 PO @ 3 PM  -     amphetamine-dextroamphetamine (ADDERALL, 10MG,) 10 mg tablet; Take 1 PO @ 3 PM          Goals addressed in session:   Good Health  Lose Wt  Counseling provided:      Treatment Recommendations- Risks Benefits       Immediate Medical/Psychiatric/Psychotherapy Treatments and Any Precautions:     Risks, Benefits And Possible Side Effects Of Medications:  Risks, benefits, and possible side effects of medications explained to patient and patient verbalizes understanding    Controlled Medication Discussion: The patient has been filling controlled prescriptions on time as prescribed to [facility] Prescription Drug Monitoring program      Reason for visit is No chief complaint on file  Medication Management     Encounter provider TEENA Davis    Provider located at [address] [place] [facility]  [address]  [phone]      Recent Visits  No visits were found meeting these conditions    Showing recent visits within past 7 days and meeting all other requirements  Today's Visits  Date Type Provider Dept   03/01/23 Telemedicine Santosh Davis today's visits and meeting all other requirements  Future Appointments  No visits were found meeting these conditions  Showing future appointments within next 150 days and meeting all other requirements       The patient was identified by name and date of birth  Cornelius Rose was informed that this is a telemedicine visit and [facility]  She agrees to proceed     My office door was closed  No one else was in the room  She acknowledged consent and understanding of privacy and security of the video platform  The patient has agreed to participate and understands they can discontinue the visit at any time  Patient is aware this is a billable service  Subjective    Cornelius Rose is a 47 y o  female    Here today for a med check  This was via [employer]    normal appetite      HPI  Mood good  Was sick this past month  Focus good  Kristina anxiety  No problems with medication  Appetite Sleep good  Health OK  Does not take meds daily  Denies SI/HI    Past Medical History:   Diagnosis Date   • Fatigue     Chronic fatigue   • GERD (gastroesophageal reflux disease)    • Hiatal hernia    • Obesity        Past Surgical History:   Procedure Laterality Date   • EXPLORATORY LAPAROTOMY         Current Outpatient Medications   Medication Sig Dispense Refill   • amphetamine-dextroamphetamine (ADDERALL XR) 20 MG 24 hr capsule Take 1 PO QD 30 capsule 0   • amphetamine-dextroamphetamine (ADDERALL XR, 20MG,) 20 MG 24 hr capsule Take 1 PO QD 30 capsule 0   • amphetamine-dextroamphetamine (ADDERALL XR, 20MG,) 20 MG 24 hr capsule Take 1 PO QD 30 capsule 0   • amphetamine-dextroamphetamine (ADDERALL) 10 mg tablet Take 1 PO @ 3PM 30 tablet 0   • amphetamine-dextroamphetamine (ADDERALL, 10MG,) 10 mg tablet Take 1 PO @ 3 PM 30 tablet 0   • amphetamine-dextroamphetamine (ADDERALL, 10MG,) 10 mg tablet Take 1 PO @ 3 PM 30 tablet 0   • albuterol (PROVENTIL HFA,VENTOLIN HFA) 90 mcg/act inhaler inhale 2 puffs every 4 to 6 hours if needed for wheezing • fexofenadine (ALLEGRA) 180 MG tablet Take 180 mg by mouth daily     • Flovent  MCG/ACT inhaler Inhale 1 puff 2 (two) times a day     • fluticasone (FLONASE) 50 mcg/act nasal spray 1 spray 2 (two) times a day     • omeprazole (PriLOSEC) 40 MG capsule take 1 capsule by mouth daily 90 capsule 1   • ranitidine (ZANTAC) 150 mg tablet Take 150 mg by mouth 2 (two) times a day       No current facility-administered medications for this visit  Allergies   Allergen Reactions   • Penicillins        Social History     Substance and Sexual Activity   Drug Use Never       Family History   Problem Relation Age of Onset   • No Known Problems Mother    • No Known Problems Father    • No Known Problems Sister    • No Known Problems Brother            Objective    Mental status:  Appearance calm and cooperative , adequate hygiene and grooming and good eye contact    Mood mood appropriate   Affect affect appropriate    Speech a normal rate and fluent   Thought Processes normal thought processes   Hallucinations no hallucinations present    Thought Content no delusions   Abnormal Thoughts no suicidal thoughts  and no homicidal thoughts    Orientation  oriented to person and place and time   Remote Memory short term memory intact and long term memory intact   Attention Span concentration intact   Intellect Appears to be of Average Intelligence   Insight Insight intact   Judgement judgment was intact   Muscle Strength Muscle strength and tone were normal and Normal gait    Language no difficulty naming common objects   Fund of Knowledge displays adequate knowledge of current events   Pain none   Pain Scale 0       Video Exam    There were no vitals filed for this visit      I spent 15 minutes directly with the patient during this visit    Patient Instructions   Continue Current Tx  Report Problems  Take meds as ordered  Return 3 months       Visit Time    Visit Start Time: 3298  Visit Stop Time: [phone]  Total Visit Duration: 16 min

## 2023-03-01 NOTE — BH TREATMENT PLAN
TREATMENT PLAN (Medication Management Only)        [facility]    Name and Date of Birth:  Lynda Wu 47 y o  1970  Date of Treatment Plan: March 1, 2023  Diagnosis/Diagnoses:    1  Attention deficit hyperactivity disorder, combined type      Strengths/Personal Resources for Self-Care: supportive family, supportive friends, taking medications as prescribed  Area/Areas of need (in own words): ADHD symptoms  1  Long Term Goal: maintain ADHD symptoms  Target Date:6 months - 9/1/2023  Person/Persons responsible for completion of goal: Nando Rueda  2  Short Term Objective (s) - How will we reach this goal?:   A  Provider new recommended medication/dosage changes and/or continue medication(s): continue current medications as prescribed Adderall, Adderall XR   B  N/A   C  N/A  Target Date:6 months - 9/1/2023  Person/Persons Responsible for Completion of Goal: Nando Rueda  Progress Towards Goals: stable  Treatment Modality: medication management every 3 months  Review due 180 days from date of this plan: 6 months - 9/1/2023  Expected length of service: maintenance  My Physician/PA/NP and I have developed this plan together and I agree to work on the goals and objectives  I understand the treatment goals that were developed for my treatment

## 2023-06-19 ENCOUNTER — DOCUMENTATION (OUTPATIENT)
Dept: PSYCHIATRY | Facility: CLINIC | Age: 53
End: 2023-06-19

## 2023-06-19 NOTE — PSYCH
[facility]    Patient Name Lori Given     Date of Birth: 47 y o  1970      MRN: [id number]    Admission Date: several years ago    Date of Transfer: June 19, 2023    Admission Diagnosis:     ADHD, Combined type    Current Diagnosis:     No diagnosis found  Reason for Admission: Trice Lozano presented for treatment due to ADHD  Primary complaints included ADHD SYMPTOMS: unremarkable  Progress in Treatment: Trice Lozano was seen for Medication Management  During the course of treatment she      Episodes of Higher Level of Care: No    Transfer request Initiated by: Psychiatrist: Nurse Practitioner Cecilia Holder Therapist: None    Reason for Transfer Request: clinician leaving practice    Does this individual need a clinician with specialized training/expertise?: No    Is this client working with any other [facility] Providers/Therapists?  Psychiatrist: None Therapist: None    Other pertinent issues: None    Are there any specific individuals who would be a “best fit” or who have already agreed to accept this transfer request?      Psychiatrist: None   Therapist: None  Rationale: Not Applicable    Attempts to maintain the current therapeutic relationship: Not Applicable    Transfer request routed to Clinical Coordinator for input and/or approval      Comments from other involved providers and/or clinical coordinator: None    CM RinconNP06/19/23

## 2023-06-28 DIAGNOSIS — F90.2 ATTENTION DEFICIT HYPERACTIVITY DISORDER, COMBINED TYPE: ICD-10-CM

## 2023-06-28 RX ORDER — DEXTROAMPHETAMINE SACCHARATE, AMPHETAMINE ASPARTATE, DEXTROAMPHETAMINE SULFATE AND AMPHETAMINE SULFATE 2.5; 2.5; 2.5; 2.5 MG/1; MG/1; MG/1; MG/1
TABLET ORAL
Qty: 30 TABLET | Refills: 0 | Status: SHIPPED | OUTPATIENT
Start: 2023-06-28

## 2023-06-28 RX ORDER — DEXTROAMPHETAMINE SACCHARATE, AMPHETAMINE ASPARTATE MONOHYDRATE, DEXTROAMPHETAMINE SULFATE AND AMPHETAMINE SULFATE 5; 5; 5; 5 MG/1; MG/1; MG/1; MG/1
CAPSULE, EXTENDED RELEASE ORAL
Qty: 30 CAPSULE | Refills: 0 | Status: SHIPPED | OUTPATIENT
Start: 2023-06-28

## 2023-07-06 ENCOUNTER — TELEPHONE (OUTPATIENT)
Dept: PSYCHIATRY | Facility: CLINIC | Age: 53
End: 2023-07-06

## 2023-07-06 NOTE — TELEPHONE ENCOUNTER
Contacted client about cancelling appt. with TEENA Reilly due to him retiring. We are in the process of bringing new providers into our practice. Once we have their schedules, we will contact you to reschedule your appt. Refills will be processed.   Any questions please call [phone]

## 2023-07-25 ENCOUNTER — TELEPHONE (OUTPATIENT)
Dept: PSYCHIATRY | Facility: CLINIC | Age: 53
End: 2023-07-25

## 2023-08-31 ENCOUNTER — TELEPHONE (OUTPATIENT)
Dept: PSYCHIATRY | Facility: CLINIC | Age: 53
End: 2023-08-31

## 2023-11-02 ENCOUNTER — TELEPHONE (OUTPATIENT)
Dept: PSYCHIATRY | Facility: CLINIC | Age: 53
End: 2023-11-02

## 2024-01-23 ENCOUNTER — OFFICE VISIT (OUTPATIENT)
Dept: PSYCHIATRY | Facility: CLINIC | Age: 54
End: 2024-01-23
Payer: COMMERCIAL

## 2024-01-23 DIAGNOSIS — F10.21 HISTORY OF ALCOHOLISM (HCC): ICD-10-CM

## 2024-01-23 DIAGNOSIS — F43.9 TRAUMA AND STRESSOR-RELATED DISORDER: Primary | ICD-10-CM

## 2024-01-23 DIAGNOSIS — F41.1 GENERALIZED ANXIETY DISORDER: ICD-10-CM

## 2024-01-23 PROCEDURE — 99214 OFFICE O/P EST MOD 30 MIN: CPT | Performed by: PSYCHIATRY & NEUROLOGY

## 2024-01-23 RX ORDER — FLUOXETINE HYDROCHLORIDE 40 MG/1
1 CAPSULE ORAL DAILY
COMMUNITY
Start: 2023-11-20 | End: 2024-01-23

## 2024-01-23 RX ORDER — CLONIDINE HYDROCHLORIDE 0.1 MG/1
TABLET ORAL
Qty: 60 TABLET | Refills: 1 | Status: SHIPPED | OUTPATIENT
Start: 2024-01-23

## 2024-01-23 RX ORDER — SERTRALINE HYDROCHLORIDE 25 MG/1
1 TABLET, FILM COATED ORAL
COMMUNITY
Start: 2023-11-20 | End: 2024-01-23

## 2024-01-23 RX ORDER — CLONIDINE HYDROCHLORIDE 0.1 MG/1
0.1 TABLET ORAL
COMMUNITY
End: 2024-01-23 | Stop reason: SDUPTHER
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
## 2024-01-23 NOTE — PATIENT INSTRUCTIONS
Sertraline was increased to 50mg  Clonidine 0.1mg : 1 -2 tablet PRN  Fluoxetine was stopped   Adderall was also discontinued   Lupis Day [phone]   Thanks for presenting to today's Appointment at [facility]

## 2024-02-05 ENCOUNTER — OFFICE VISIT (OUTPATIENT)
Dept: PSYCHIATRY | Facility: CLINIC | Age: 54
End: 2024-02-05
Payer: COMMERCIAL

## 2024-02-05 DIAGNOSIS — F10.11 ALCOHOL USE DISORDER, MILD, IN EARLY REMISSION: ICD-10-CM

## 2024-02-05 DIAGNOSIS — F43.9 TRAUMA AND STRESSOR-RELATED DISORDER: Primary | ICD-10-CM

## 2024-02-05 DIAGNOSIS — F41.1 GENERALIZED ANXIETY DISORDER: ICD-10-CM

## 2024-02-05 PROCEDURE — 99212 OFFICE O/P EST SF 10 MIN: CPT | Performed by: PSYCHIATRY & NEUROLOGY

## 2024-02-05 NOTE — BH CRISIS PLAN
[facility]    Client Name: Lupis Day       Client YOB: 1970     CRISIS PLAN Creation Date: 02/05/24 and CRISIS PLAN Review Date : 1 yr -2/5/2025  ____________________________________________________________________________________________________________  [employer] SAF[employer]Y PLAN      Step 1: Warning Signs:   Lupis Day you stated  you never any suicidal thoughts; although about anxiety you stated:  Worries  Thinking too much about problems  Triggers lableled as problems with someone         Step 2: Internal Coping Strategies:   Lupis Day you stated:  Yoga  Guided Meditation          Step 3: People and social settings that provide distraction:                    Names                                                                    Contacts   Trery Day ( Lupis )                              [phone]                               Shanique  ( Lupis AA Sponsor                                                                                                      Places:   AA Meeting                                                                                             Step 4: People whom I can ask for help during a crisis:                            Name                                                                       Contact  Terry Day                                                             same answer as above  Mitchell Chaudhary  ( Lupis 22 yo son)                          [phone]        Step 5: Professionals or agencies I can contact during a crisis:Ask: “What are the names of health care professionals, agencies, hospitals or other organizations that you can contact during a crisis and how will you contact them (include phone number or other way to contact them)?”    Clinician/Agency Name:                                         Phone                                               Emergency Contact   Police  "Department CRISIS Organization            Lupis Day has access to number    Lupis  is emergency contact                Local Emergency Department:                              Emergency Dept Phone                  Emergency Dept Address    [facility] number                                    same answers as above   [facility] CRISIS Center       \"            \"         \"    \"  [facility]                                  \"             \"         \"    \"           CRISIS PHONE NUMBERS   Suicide Prevention Lifeline: Call 7501-029-DNCE or Text  988 Crisis Text Line: Text HOME to 939-842   Please note: [facility] do not have a separate number for Child/Adolescent specific crisis. If your county is not listed under Child/Adolescent, please call the adult number for your county      Adult Crisis Numbers: Child/Adolescent Crisis Numbers   [facility]: [phone] [facility]: [phone]   [facility]: [phone] [facility]: [phone]   [facility]: [phone] [place]: [phone]   [facility]: [phone] [place]: [phone]   [place]: [phone]   [facility]: [phone]   [facility]: [phone]   [facility]: [phone] (daytime) [phone] (after hours, weekends, holidays)      Step 6: Making the environment safer (plan for lethal means safety):Ask: “For each lethal method that is identified, what is the specific plan to reduce access to this lethal method so that time will pass, your suicide feelings will diminish and it will be less likely that you will actually kill yourself? 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  Plans  to reduce access: Lupis stated when not working weapon are locked in a safety. Lupis and  has the keys   or "  would assist             Optional:What is most important to me and worth living for?   Lupis Day you stated: Your life, Family and then work         Johnnie Safety Plan. Alisson Martin and Roberto Khan ;  Use permission via  authors  ____________________________________________________________________________________________________________    This CRISIS plan was formulated via my Physician/ Psychiatrist and I . I,Lupis Day patient ,   , understand and accept the CRISIS plan  developed for my treatment.
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
## 2024-02-05 NOTE — PATIENT INSTRUCTIONS
Lupis Day [id number]   Thanks for presenting to today's Appointment at [facility]  Your medications were not changed

## 2024-02-22 DIAGNOSIS — F43.9 TRAUMA AND STRESSOR-RELATED DISORDER: ICD-10-CM

## 2024-02-22 DIAGNOSIS — F41.1 GENERALIZED ANXIETY DISORDER: ICD-10-CM

## 2024-02-22 RX ORDER — CLONIDINE HYDROCHLORIDE 0.1 MG/1
TABLET ORAL
Qty: 60 TABLET | Refills: 1 | OUTPATIENT
Start: 2024-02-22

## 2024-02-22 RX ORDER — CLONIDINE HYDROCHLORIDE 0.1 MG/1
TABLET ORAL
Qty: 60 TABLET | Refills: 2 | Status: SHIPPED | OUTPATIENT
Start: 2024-02-22

## 2024-02-22 NOTE — TELEPHONE ENCOUNTER
Pt Lupis Day, 1970, [facility] chart was reviewed. Clonidine 0.1 qty 180 2rf was sent to [facility]    Medications Prescribed During [facility] Encounter:  -     cloNIDine (CATAPRES) 0.1 mg tablet; Clonidine 0.1m -2 tablet po PRN.  Hold if BP< 100/<60; and pulse < 60       This note was not shared with the patient due to reasonable likelihood of causing patient harm        This note may have been written with the assistance of dictation software. Please excuse any grammatical  errors, misspellings,  and abnormal spacing of letters , sentences or paragraphs . For accurate interpretation should read note horizontally

## 2024-03-04 ENCOUNTER — OFFICE VISIT (OUTPATIENT)
Dept: PSYCHIATRY | Facility: CLINIC | Age: 54
End: 2024-03-04
Payer: COMMERCIAL

## 2024-03-04 DIAGNOSIS — F41.1 GENERALIZED ANXIETY DISORDER: ICD-10-CM

## 2024-03-04 DIAGNOSIS — F10.11 ALCOHOL USE DISORDER, MILD, IN EARLY REMISSION: ICD-10-CM

## 2024-03-04 DIAGNOSIS — F43.9 TRAUMA AND STRESSOR-RELATED DISORDER: Primary | ICD-10-CM

## 2024-03-04 PROBLEM — F43.10 PTSD (POST-TRAUMATIC STRESS DISORDER): Status: ACTIVE | Noted: 2024-01-23

## 2024-03-04 PROCEDURE — 99214 OFFICE O/P EST MOD 30 MIN: CPT | Performed by: PSYCHIATRY & NEUROLOGY

## 2024-03-04 RX ORDER — LAMOTRIGINE 25 MG/1
TABLET ORAL
Qty: 60 TABLET | Refills: 1 | Status: SHIPPED | OUTPATIENT
Start: 2024-03-04

## 2024-03-04 NOTE — PSYCH
[facility]/Hospital: [facility]   Mental [facility] Outpatient Clinic-Non Addiction   [address] [id number] [phone]    Psychiatric Progress Note  MRN#: [id number]  Lupis Day 53 y.o. female    This note was not shared with the patient due to reasonable likelihood of causing patient harm   ___________________________________________________________________________________________________________________________________  OFFICE APPOINTMENT   Seen today at [facility] location                                                Patient Lupis Day female 1970   Prescriber/Physician: Javed Lo DO, she/her Physician Location:   [facility] OUTPATIENT  [address] [address]     This Service was provided in the office. Patient current location: [place], where I am  licensed.   Patient gave consent to proceed with encounter; acknowledge understanding of security and privacy of encounter   [facility]   Patient verbalized understanding evaluation only involves Psychiatric diagnosing, prescribing, result monitoring   Patient was informed this is a billable service and legal   ___________________________________________________________________________________________________________________________________       Reason for Visit:  No chief complaint on file.        Subjective:  Lupis Day daily headache since starting Sertraline, stopped Sertraline brieftly then restarted and headache restarted; worst in the morning while Lupis take Sertraline at night  Otherwise Sertraline was workin; trauma and anxiety as linked , Lupis rated intensity 6 out of 10  ROS:  SI/Hi - denies  Anxiety: minimal   PTSD: Lupis has nightmares not daily , sleep duration 9 hrs   Tobacco: denies  Alcohol: tomorrow Lupis's ~ 2 wks towards 5 months sober.       Medication: Lupis Barb is  compliant  Sertraline  50mg,  Clonidine 0.1mg , half pills  Medication s/e- denies      Medical ROS:   Pertinent items are noted in HPI, all other symptoms are negative         Mental Status Evaluation:  General Appearance:  Lupis Day is a 53 y.o.  female casually dressed, looks stated age   Behavior:  pleasant, cooperative, appropriate eye contact    Speech:  WNL rate, rhythm, volume, latency, amount,   Mood:  Mildly anxious    Affect:  normal to hyper slightly   Thought Process:  normal and logical   Thought Content:  no overt delusions, normal   Perceptual Disturbances: Does not appear responding or preoccupied  Delusions  w/o   Risk Potential: Suicidal Ideations w/o  Homicidal Ideations w/o  Potential for Aggression  w/o   Sensorium:  Oriented to person, place ([facility]), time/date ( 2024), and situation   Memory:  recent and remote memory grossly intact   Consciousness:  alert and awake   Attention: attention span and concentration are appropriate    Insight:  appropriate   Judgment: appropriate   Gait/Station: normal   Motor Activity: no abnormal movements     There were no vitals filed for this visit.     Medications: verified with pt . Lupis Day 1970, during [facility] appointment on 24  Current Outpatient Medications on File Prior to Visit   Medication Sig Dispense Refill   • albuterol (PROVENTIL HFA,VENTOLIN HFA) 90 mcg/act inhaler inhale 2 puffs every 4 to 6 hours if needed for wheezing     • cloNIDine (CATAPRES) 0.1 mg tablet Clonidine 0.1m -2 tablet po PRN.  Hold if BP< 100/<60; and pulse < 60 60 tablet 2   • fexofenadine (ALLEGRA) 180 MG tablet Take 180 mg by mouth daily     • Flovent  MCG/ACT inhaler Inhale 1 puff 2 (two) times a day     • fluticasone (FLONASE) 50 mcg/act nasal spray 1 spray 2 (two) times a day     • omeprazole (PriLOSEC) 40 MG capsule take 1 capsule by mouth daily 90 capsule 1   • sertraline (Zoloft) 50 mg tablet Sertraline 50m tablet po daily 30 tablet 1      No current facility-administered medications on file prior to visit.        Labs: I have personally reviewed all pertinent laboratory/tests results.   Admission Labs:   Office Visit on 10/05/2022   Component Date Value   • ABO Grouping 10/06/2022 O    • Rh Type 10/06/2022 Positive    • SL AMB ANTIBODY SCREEN, * 10/06/2022 Negative    • Glucose, Random 10/06/2022 81    • BUN 10/06/2022 9    • Creatinine 10/06/2022 0.88    • eGFR 10/06/2022 79    • SL AMB BUN/CREATININE RA* 10/06/2022 10    • Sodium 10/06/2022 138    • Potassium 10/06/2022 4.7    • Chloride 10/06/2022 104    • CO2 10/06/2022 22    • CALCIUM 10/06/2022 9.0    • White Blood Cell Count 10/06/2022 4.8    • Red Blood Cell Count 10/06/2022 4.66    • Hemoglobin 10/06/2022 13.6    • HCT 10/06/2022 40.3    • MCV 10/06/2022 87    • MCH 10/06/2022 29.2    • MCHC 10/06/2022 33.7    • RDW 10/06/2022 14.4    • Platelet Count 10/06/2022 383    • aPTT 10/06/2022 30    • INR 10/06/2022 0.9    • Prothrombin Time 10/06/2022 9.8      EKG   Lab Results   Component Value Date    VENTRATE 92 06/04/2022    ATRIALRATE 92 06/04/2022    PRINT 170 06/04/2022    QRSDINT 74 06/04/2022    QTINT 412 06/04/2022    QTCINT 509 06/04/2022    PAXIS 46 06/04/2022    QRSAXIS -8 06/04/2022    TWAVEAXIS 18 06/04/2022     Imaging Studies: No results found.    ________________________________________________________________________    A/P  Lupis Day,is a 53 y.o., female  , history of alcohol abuse , s/p detox and rehab, h/o ADHD ( off stimulants,  who presents with generalized anxiety and trauma symptoms in the setting of situational stressors. Presented on Sertraline, Fluoxetine , with  findings of pressured dialogue, elated mood, distractiable, and high energy. D/c Fluoxetine, started Sertraline and Clonidine. Currently on both > 4 wks, with reports of intolerable headaches , otherwise less anxiety and trauma ,w/o alcohol relapse.    DSM5     1. Trauma and stressor-related  disorder    2. Generalized anxiety disorder    3. Alcohol use disorder, mild, in early remission         PLAN:    History, external records was reviewed. Discussed clinical findings and diagnotic impression; improving , s/e headaches  Discontinued Sertraline 50mg  Discussed starting low dose Lamotrigine for mood, pt Lupis Barb was accepting of plan  CRISIS plan was completed , pt.Lupis Day signed form  Did not complete Treatment plan cause of recent changes in Lupis regimen.     Medications Prescribed During [facility] Encounter:    -     lamoTRIgine (LaMICtal) 25 mg tablet; Lamotrigine 25m tablet po daily x 2 wks. Then Increase to 2 tablets daily    Medication List Also     -     cloNIDine (CATAPRES) 0.1 mg tablet; Clonidine 0.1m -2 tablet po PRN.  Hold if BP< 100/<60; and pulse < 60      Medications Discontinued During This Encounter   Medication Reason   • sertraline (Zoloft) 50 mg tablet             Treatement: Risks, benefits, and possible side effects of medications explained to patient and patient verbalizes understanding.        [facility]  _________________________________________________________________________________________________________  Patient Encounter:8:31- 8:56    Encounter Duration: Time Spent  25 minutes with Patient.Greater than 50% of total time was spent with the patient     MDM  Number of Diagnoses or Management Options  Diagnosis management comments: 3       Amount and/or Complexity of Data Reviewed  Review and summarize past medical records: yes    Risk of Complications, Morbidity, and/or Mortality  Presenting problems: moderate  Diagnostic procedures: moderate  Management options: moderate         This note may have been written with the assistance of dictation software. Please excuse any grammatical  errors, misspellings,  and abnormal spacing of letters , sentences or paragraphs . For accurate interpretation should read note horizontally

## 2024-03-04 NOTE — PATIENT INSTRUCTIONS
Lupis Day [id number]   Thanks for presenting to today's Appointment at [facility]  Lamotrigine was started : 1 tablet x 2 wks, then 2 tablets  Sertraline was stopped   Your other medications were not changed

## 2024-04-17 ENCOUNTER — TELEPHONE (OUTPATIENT)
Dept: PSYCHIATRY | Facility: CLINIC | Age: 54
End: 2024-04-17

## 2025-01-12 ENCOUNTER — HOSPITAL ENCOUNTER (EMERGENCY)
Facility: HOSPITAL | Age: 55
Discharge: HOME | End: 2025-01-12
Attending: EMERGENCY MEDICINE
Payer: COMMERCIAL

## 2025-01-12 ENCOUNTER — APPOINTMENT (EMERGENCY)
Dept: CT IMAGING | Facility: HOSPITAL | Age: 55
End: 2025-01-12
Payer: COMMERCIAL

## 2025-01-12 VITALS
HEIGHT: 62 IN | DIASTOLIC BLOOD PRESSURE: 75 MMHG | WEIGHT: 170 LBS | RESPIRATION RATE: 20 BRPM | OXYGEN SATURATION: 97 % | TEMPERATURE: 99.3 F | HEART RATE: 123 BPM | SYSTOLIC BLOOD PRESSURE: 125 MMHG | BODY MASS INDEX: 31.28 KG/M2

## 2025-01-12 DIAGNOSIS — J45.909 ASTHMA: Primary | ICD-10-CM

## 2025-01-12 LAB
2HR DELTA HS TROPONIN: 1 NG/L
ALBUMIN SERPL BCG-MCNC: 4 G/DL (ref 3.5–5)
ALP SERPL-CCNC: 77 U/L (ref 34–104)
ALT SERPL W P-5'-P-CCNC: 11 U/L (ref 7–52)
ANION GAP SERPL CALCULATED.3IONS-SCNC: 9 MMOL/L (ref 4–13)
AST SERPL W P-5'-P-CCNC: 12 U/L (ref 13–39)
ATRIAL RATE: 124 BPM
BACTERIA UR QL AUTO: ABNORMAL /HPF
BASOPHILS # BLD AUTO: 0.03 THOUSANDS/ΜL (ref 0–0.1)
BASOPHILS NFR BLD AUTO: 0 % (ref 0–1)
BILIRUB SERPL-MCNC: 0.21 MG/DL (ref 0.2–1)
BILIRUB UR QL STRIP: NEGATIVE
BUN SERPL-MCNC: 16 MG/DL (ref 5–25)
CALCIUM SERPL-MCNC: 8.9 MG/DL (ref 8.4–10.2)
CARDIAC TROPONIN I PNL SERPL HS: 7 NG/L (ref ?–50)
CARDIAC TROPONIN I PNL SERPL HS: 8 NG/L (ref ?–50)
CHLORIDE SERPL-SCNC: 102 MMOL/L (ref 96–108)
CLARITY UR: CLEAR
CO2 SERPL-SCNC: 26 MMOL/L (ref 21–32)
COLOR UR: YELLOW
CREAT SERPL-MCNC: 0.79 MG/DL (ref 0.6–1.3)
EOSINOPHIL # BLD AUTO: 0.01 THOUSAND/ΜL (ref 0–0.61)
EOSINOPHIL NFR BLD AUTO: 0 % (ref 0–6)
ERYTHROCYTE [DISTWIDTH] IN BLOOD BY AUTOMATED COUNT: 15.6 % (ref 11.6–15.1)
FLUAV AG UPPER RESP QL IA.RAPID: NEGATIVE
FLUBV AG UPPER RESP QL IA.RAPID: NEGATIVE
GFR SERPL CREATININE-BSD FRML MDRD: 84 ML/MIN/1.73SQ M
GLUCOSE SERPL-MCNC: 89 MG/DL (ref 65–140)
GLUCOSE UR STRIP-MCNC: NEGATIVE MG/DL
HCT VFR BLD AUTO: 28.9 % (ref 34.8–46.1)
HGB BLD-MCNC: 8.9 G/DL (ref 11.5–15.4)
HGB UR QL STRIP.AUTO: ABNORMAL
IMM GRANULOCYTES # BLD AUTO: 0.08 THOUSAND/UL (ref 0–0.2)
IMM GRANULOCYTES NFR BLD AUTO: 1 % (ref 0–2)
KETONES UR STRIP-MCNC: ABNORMAL MG/DL
LACTATE SERPL-SCNC: 1.6 MMOL/L (ref 0.5–2)
LEUKOCYTE ESTERASE UR QL STRIP: NEGATIVE
LIPASE SERPL-CCNC: 6 U/L (ref 11–82)
LYMPHOCYTES # BLD AUTO: 0.52 THOUSANDS/ΜL (ref 0.6–4.47)
LYMPHOCYTES NFR BLD AUTO: 7 % (ref 14–44)
MCH RBC QN AUTO: 24.5 PG (ref 26.8–34.3)
MCHC RBC AUTO-ENTMCNC: 30.8 G/DL (ref 31.4–37.4)
MCV RBC AUTO: 80 FL (ref 82–98)
MONOCYTES # BLD AUTO: 0.48 THOUSAND/ΜL (ref 0.17–1.22)
MONOCYTES NFR BLD AUTO: 6 % (ref 4–12)
MUCOUS THREADS UR QL AUTO: ABNORMAL
NEUTROPHILS # BLD AUTO: 6.75 THOUSANDS/ΜL (ref 1.85–7.62)
NEUTS SEG NFR BLD AUTO: 86 % (ref 43–75)
NITRITE UR QL STRIP: NEGATIVE
NON-SQ EPI CELLS URNS QL MICRO: ABNORMAL /HPF
NRBC BLD AUTO-RTO: 0 /100 WBCS
P AXIS: 31 DEGREES
PH UR STRIP.AUTO: 7 [PH]
PLATELET # BLD AUTO: 514 THOUSANDS/UL (ref 149–390)
PMV BLD AUTO: 8.5 FL (ref 8.9–12.7)
POTASSIUM SERPL-SCNC: 3.7 MMOL/L (ref 3.5–5.3)
PR INTERVAL: 128 MS
PROCALCITONIN SERPL-MCNC: 0.11 NG/ML
PROT SERPL-MCNC: 7.4 G/DL (ref 6.4–8.4)
PROT UR STRIP-MCNC: ABNORMAL MG/DL
QRS AXIS: -2 DEGREES
QRSD INTERVAL: 70 MS
QT INTERVAL: 416 MS
QTC INTERVAL: 597 MS
RBC # BLD AUTO: 3.63 MILLION/UL (ref 3.81–5.12)
RBC #/AREA URNS AUTO: ABNORMAL /HPF
SARS-COV+SARS-COV-2 AG RESP QL IA.RAPID: NEGATIVE
SODIUM SERPL-SCNC: 137 MMOL/L (ref 135–147)
SP GR UR STRIP.AUTO: 1.01 (ref 1–1.03)
T WAVE AXIS: 36 DEGREES
UROBILINOGEN UR STRIP-ACNC: <2 MG/DL
VENTRICULAR RATE: 124 BPM
WBC # BLD AUTO: 7.87 THOUSAND/UL (ref 4.31–10.16)
WBC #/AREA URNS AUTO: ABNORMAL /HPF

## 2025-01-12 PROCEDURE — 96374 THER/PROPH/DIAG INJ IV PUSH: CPT

## 2025-01-12 PROCEDURE — 87804 INFLUENZA ASSAY W/OPTIC: CPT | Performed by: EMERGENCY MEDICINE

## 2025-01-12 PROCEDURE — 99285 EMERGENCY DEPT VISIT HI MDM: CPT | Performed by: EMERGENCY MEDICINE

## 2025-01-12 PROCEDURE — 85379 FIBRIN DEGRADATION QUANT: CPT | Performed by: EMERGENCY MEDICINE

## 2025-01-12 PROCEDURE — 94640 AIRWAY INHALATION TREATMENT: CPT

## 2025-01-12 PROCEDURE — 80053 COMPREHEN METABOLIC PANEL: CPT | Performed by: EMERGENCY MEDICINE

## 2025-01-12 PROCEDURE — 99284 EMERGENCY DEPT VISIT MOD MDM: CPT

## 2025-01-12 PROCEDURE — 96361 HYDRATE IV INFUSION ADD-ON: CPT

## 2025-01-12 PROCEDURE — 36415 COLL VENOUS BLD VENIPUNCTURE: CPT | Performed by: EMERGENCY MEDICINE

## 2025-01-12 PROCEDURE — 93010 ELECTROCARDIOGRAM REPORT: CPT | Performed by: INTERNAL MEDICINE

## 2025-01-12 PROCEDURE — 74177 CT ABD & PELVIS W/CONTRAST: CPT

## 2025-01-12 PROCEDURE — 83690 ASSAY OF LIPASE: CPT | Performed by: EMERGENCY MEDICINE

## 2025-01-12 PROCEDURE — 81001 URINALYSIS AUTO W/SCOPE: CPT | Performed by: EMERGENCY MEDICINE

## 2025-01-12 PROCEDURE — 84145 PROCALCITONIN (PCT): CPT | Performed by: EMERGENCY MEDICINE

## 2025-01-12 PROCEDURE — 93005 ELECTROCARDIOGRAM TRACING: CPT

## 2025-01-12 PROCEDURE — 84484 ASSAY OF TROPONIN QUANT: CPT | Performed by: EMERGENCY MEDICINE

## 2025-01-12 PROCEDURE — 71260 CT THORAX DX C+: CPT

## 2025-01-12 PROCEDURE — 87811 SARS-COV-2 COVID19 W/OPTIC: CPT | Performed by: EMERGENCY MEDICINE

## 2025-01-12 PROCEDURE — 96375 TX/PRO/DX INJ NEW DRUG ADDON: CPT

## 2025-01-12 PROCEDURE — 85025 COMPLETE CBC W/AUTO DIFF WBC: CPT | Performed by: EMERGENCY MEDICINE

## 2025-01-12 PROCEDURE — 87040 BLOOD CULTURE FOR BACTERIA: CPT | Performed by: EMERGENCY MEDICINE

## 2025-01-12 PROCEDURE — 83605 ASSAY OF LACTIC ACID: CPT | Performed by: EMERGENCY MEDICINE

## 2025-01-12 RX ORDER — SODIUM CHLORIDE 9 MG/ML
3 INJECTION INTRAVENOUS EVERY 8 HOURS SCHEDULED
Status: DISCONTINUED | OUTPATIENT
Start: 2025-01-12 | End: 2025-01-12 | Stop reason: HOSPADM

## 2025-01-12 RX ORDER — ALBUTEROL SULFATE 90 UG/1
2 INHALANT RESPIRATORY (INHALATION) EVERY 4 HOURS PRN
Qty: 6.7 G | Refills: 1 | Status: SHIPPED | OUTPATIENT
Start: 2025-01-12

## 2025-01-12 RX ORDER — KETOROLAC TROMETHAMINE 30 MG/ML
30 INJECTION, SOLUTION INTRAMUSCULAR; INTRAVENOUS ONCE
Status: COMPLETED | OUTPATIENT
Start: 2025-01-12 | End: 2025-01-12

## 2025-01-12 RX ORDER — METHYLPREDNISOLONE 4 MG/1
4 TABLET ORAL SEE ADMIN INSTRUCTIONS
Qty: 21 TABLET | Refills: 0 | Status: SHIPPED | OUTPATIENT
Start: 2025-01-13

## 2025-01-12 RX ORDER — ONDANSETRON 2 MG/ML
4 INJECTION INTRAMUSCULAR; INTRAVENOUS ONCE
Status: COMPLETED | OUTPATIENT
Start: 2025-01-12 | End: 2025-01-12

## 2025-01-12 RX ORDER — LEVALBUTEROL INHALATION SOLUTION 1.25 MG/3ML
1.25 SOLUTION RESPIRATORY (INHALATION) ONCE
Status: COMPLETED | OUTPATIENT
Start: 2025-01-12 | End: 2025-01-12

## 2025-01-12 RX ORDER — ACETAMINOPHEN 325 MG/1
975 TABLET ORAL ONCE
Status: COMPLETED | OUTPATIENT
Start: 2025-01-12 | End: 2025-01-12

## 2025-01-12 RX ORDER — DIPHENHYDRAMINE HYDROCHLORIDE 50 MG/ML
25 INJECTION INTRAMUSCULAR; INTRAVENOUS ONCE
Status: COMPLETED | OUTPATIENT
Start: 2025-01-12 | End: 2025-01-12

## 2025-01-12 RX ORDER — PROCHLORPERAZINE MALEATE 10 MG
5 TABLET ORAL EVERY 6 HOURS PRN
Qty: 10 TABLET | Refills: 0 | Status: SHIPPED | OUTPATIENT
Start: 2025-01-12

## 2025-01-12 RX ORDER — METHYLPREDNISOLONE SODIUM SUCCINATE 125 MG/2ML
125 INJECTION, POWDER, LYOPHILIZED, FOR SOLUTION INTRAMUSCULAR; INTRAVENOUS ONCE
Status: COMPLETED | OUTPATIENT
Start: 2025-01-12 | End: 2025-01-12

## 2025-01-12 RX ORDER — METOCLOPRAMIDE HYDROCHLORIDE 5 MG/ML
10 INJECTION INTRAMUSCULAR; INTRAVENOUS ONCE
Status: COMPLETED | OUTPATIENT
Start: 2025-01-12 | End: 2025-01-12

## 2025-01-12 RX ADMIN — METHYLPREDNISOLONE SODIUM SUCCINATE 125 MG: 125 INJECTION, POWDER, FOR SOLUTION INTRAMUSCULAR; INTRAVENOUS at 13:52

## 2025-01-12 RX ADMIN — SODIUM CHLORIDE 1000 ML: 0.9 INJECTION, SOLUTION INTRAVENOUS at 10:28

## 2025-01-12 RX ADMIN — ACETAMINOPHEN 975 MG: 325 TABLET, FILM COATED ORAL at 14:00

## 2025-01-12 RX ADMIN — DIPHENHYDRAMINE HYDROCHLORIDE 25 MG: 50 INJECTION, SOLUTION INTRAMUSCULAR; INTRAVENOUS at 13:54

## 2025-01-12 RX ADMIN — ONDANSETRON 4 MG: 2 INJECTION, SOLUTION INTRAMUSCULAR; INTRAVENOUS at 10:29

## 2025-01-12 RX ADMIN — IOHEXOL 75 ML: 350 INJECTION, SOLUTION INTRAVENOUS at 12:00

## 2025-01-12 RX ADMIN — LEVALBUTEROL HYDROCHLORIDE 1.25 MG: 1.25 SOLUTION RESPIRATORY (INHALATION) at 14:00

## 2025-01-12 RX ADMIN — METOCLOPRAMIDE 10 MG: 5 INJECTION, SOLUTION INTRAMUSCULAR; INTRAVENOUS at 13:57

## 2025-01-12 RX ADMIN — KETOROLAC TROMETHAMINE 30 MG: 30 INJECTION, SOLUTION INTRAMUSCULAR; INTRAVENOUS at 10:30

## 2025-01-12 RX ADMIN — LEVALBUTEROL HYDROCHLORIDE 1.25 MG: 1.25 SOLUTION RESPIRATORY (INHALATION) at 10:32

## 2025-01-12 RX ADMIN — SODIUM CHLORIDE, PRESERVATIVE FREE 3 ML: 5 INJECTION INTRAVENOUS at 14:03

## 2025-01-12 NOTE — ED PROVIDER NOTES
Time reflects when diagnosis was documented in both MDM as applicable and the Disposition within this note       Time User Action Codes Description Comment    1/12/2025  2:43 PM Terry Soto Add [J45.909] Asthma           ED Disposition       ED Disposition   Discharge    Condition   Stable    Date/Time   Sun Jan 12, 2025  2:36 PM    Comment   Lupis Day discharge to home/self care.                   Assessment & Plan       Medical Decision Making  Diff includes viral syndrome, asthma exacerbation, coronary episode, gastritis    Amount and/or Complexity of Data Reviewed  Labs: ordered.  Radiology: ordered.    Risk  OTC drugs.  Prescription drug management.        ED Course as of 01/12/25 1814   Sun Jan 12, 2025   1446 Patient already aware of fibroids       Medications   levalbuterol (XOPENEX) inhalation solution 1.25 mg (1.25 mg Nebulization Given 1/12/25 1032)   ketorolac (TORADOL) injection 30 mg (30 mg Intravenous Given 1/12/25 1030)   ondansetron (ZOFRAN) injection 4 mg (4 mg Intravenous Given 1/12/25 1029)   sodium chloride 0.9 % bolus 1,000 mL (0 mL Intravenous Stopped 1/12/25 1208)   iohexol (OMNIPAQUE) 350 MG/ML injection (MULTI-DOSE) 100 mL (75 mL Intravenous Given 1/12/25 1200)   methylPREDNISolone sodium succinate (Solu-MEDROL) injection 125 mg (125 mg Intravenous Given 1/12/25 1352)   metoclopramide (REGLAN) injection 10 mg (10 mg Intravenous Given 1/12/25 1357)   diphenhydrAMINE (BENADRYL) injection 25 mg (25 mg Intravenous Given 1/12/25 1354)   levalbuterol (XOPENEX) inhalation solution 1.25 mg (1.25 mg Nebulization Given 1/12/25 1400)   acetaminophen (TYLENOL) tablet 975 mg (975 mg Oral Given 1/12/25 1400)       ED Risk Strat Scores                          SBIRT 20yo+      Flowsheet Row Most Recent Value   Initial Alcohol Screen: US AUDIT-C     1. How often do you have a drink containing alcohol? 0 Filed at: 01/12/2025 0919   2. How many drinks containing alcohol do you have on a typical day you  are drinking?  0 Filed at: 01/12/2025 0919   3a. Male UNDER 65: How often do you have five or more drinks on one occasion? 0 Filed at: 01/12/2025 0919   3b. FEMALE Any Age, or MALE 65+: How often do you have 4 or more drinks on one occassion? 0 Filed at: 01/12/2025 0919   Audit-C Score 0 Filed at: 01/12/2025 0919   ERIN: How many times in the past year have you...    Used an illegal drug or used a prescription medication for non-medical reasons? Never Filed at: 01/12/2025 0919                            History of Present Illness       Chief Complaint   Patient presents with    Cold Like Symptoms     Pt states that she has been coughing, and her asthma is acting up. Pt states that this started on friday       Past Medical History:   Diagnosis Date    Asthma     GERD (gastroesophageal reflux disease)     Hiatal hernia     Obesity       Past Surgical History:   Procedure Laterality Date    EXPLORATORY LAPAROTOMY      SHOULDER SURGERY      ligment repair  ( 2014 , 2015)      Family History   Problem Relation Age of Onset    No Known Problems Mother     No Known Problems Father     No Known Problems Sister     No Known Problems Brother       Social History     Tobacco Use    Smoking status: Never    Smokeless tobacco: Never   Vaping Use    Vaping status: Never Used   Substance Use Topics    Alcohol use: Not Currently     Comment: 01/23/24 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     Hx from patient c/o SOB dry cough, sternal chest discomfort with cough, intractable vomiting, abd pain, 3 days now, no relief with inhalers and otc cough medicines.  No fevers.        Review of Systems   Constitutional:  Negative for chills and fever.   HENT:   Negative for rhinorrhea and sore throat.    Respiratory:  Positive for cough and shortness of breath.    Cardiovascular:  Positive for chest pain.   Gastrointestinal:  Positive for abdominal pain, nausea and vomiting. Negative for constipation.   Genitourinary:  Negative for dysuria and frequency.   Skin:  Negative for rash.   Neurological:  Positive for headaches.   All other systems reviewed and are negative.          Objective       ED Triage Vitals   Temperature Pulse Blood Pressure Respirations SpO2 Patient Position - Orthostatic VS   01/12/25 0859 01/12/25 0859 01/12/25 0859 01/12/25 0859 01/12/25 0859 01/12/25 0859   99.3 °F (37.4 °C) (!) 125 119/79 20 98 % Lying      Temp Source Heart Rate Source BP Location FiO2 (%) Pain Score    01/12/25 0859 01/12/25 0859 01/12/25 0859 -- 01/12/25 1030    Temporal Monitor Left arm  10 - Worst Possible Pain      Vitals      Date and Time Temp Pulse SpO2 Resp BP Pain Score FACES Pain Rating User   01/12/25 1400 -- 123 97 % 20 125/75 -- -- MIKHAIL   01/12/25 1330 -- 124 93 % 19 121/7 -- -- KP   01/12/25 1310 -- -- -- -- -- 10 - Worst Possible Pain -- AB   01/12/25 1209 -- -- -- -- -- 10 - Worst Possible Pain -- KP   01/12/25 1130 -- 127 95 % 24 126/68 -- -- KP   01/12/25 1030 -- -- -- -- -- 10 - Worst Possible Pain -- KP   01/12/25 1000 -- 122 98 % 26 117/70 -- -- KP   01/12/25 0859 99.3 °F (37.4 °C) 125 98 % 20 119/79 -- -- LD            Physical Exam  Vitals and nursing note reviewed.   Constitutional:       Appearance: She is well-developed.   HENT:      Head: Normocephalic and atraumatic.      Right Ear: External ear normal.      Left Ear: External ear normal.      Nose: Nose normal.   Eyes:      Conjunctiva/sclera: Conjunctivae normal.      Pupils: Pupils are equal, round, and reactive to light.   Cardiovascular:      Rate and Rhythm: Regular rhythm. Tachycardia present.      Heart sounds: Normal heart sounds.   Pulmonary:      Effort: Pulmonary effort is normal. No  respiratory distress.      Breath sounds: Rhonchi present. No wheezing.   Chest:      Chest wall: Tenderness present.   Abdominal:      General: Bowel sounds are normal. There is no distension.      Palpations: Abdomen is soft.      Tenderness: There is abdominal tenderness.   Musculoskeletal:         General: No deformity. Normal range of motion.      Cervical back: Normal range of motion and neck supple. No spinous process tenderness.   Skin:     General: Skin is warm and dry.      Findings: No rash.   Neurological:      General: No focal deficit present.      Mental Status: She is alert.      GCS: GCS eye subscore is 4. GCS verbal subscore is 5. GCS motor subscore is 6.      Sensory: No sensory deficit.   Psychiatric:         Mood and Affect: Mood normal.         Results Reviewed       Procedure Component Value Units Date/Time    Blood culture #1 [[id number]] Collected: 01/12/25 1013    Lab Status: Preliminary result Specimen: Blood from Arm, Left Updated: 01/12/25 1801     Blood Culture Received in Microbiology Lab. Culture in Progress.    Blood culture #2 [[id number]] Collected: 01/12/25 1013    Lab Status: Preliminary result Specimen: Blood from Arm, Right Updated: 01/12/25 1801     Blood Culture Received in Microbiology Lab. Culture in Progress.    Urine Microscopic [[id number]]  (Abnormal) Collected: 01/12/25 1243    Lab Status: Final result Specimen: Urine, Clean Catch Updated: 01/12/25 1322     RBC, UA 1-2 /hpf      WBC, UA None Seen /hpf      Epithelial Cells Moderate /hpf      Bacteria, UA Occasional /hpf      MUCUS THREADS Occasional    HS Troponin I 2hr [[id number]]  (Normal) Collected: 01/12/25 1245    Lab Status: Final result Specimen: Blood from Arm, Left Updated: 01/12/25 1313     hs TnI 2hr 8 ng/L      Delta 2hr hsTnI 1 ng/L     UA w Reflex to Microscopic w Reflex to Culture [[id number]]  (Abnormal) Collected: 01/12/25 1243    Lab Status: Final result Specimen: Urine, Clean Catch Updated: 01/12/25 1256      Color, UA Yellow     Clarity, UA Clear     Specific Gravity, UA 1.015     pH, UA 7.0     Leukocytes, UA Negative     Nitrite, UA Negative     Protein, UA Trace mg/dl      Glucose, UA Negative mg/dl      Ketones, UA Trace mg/dl      Urobilinogen, UA <2.0 mg/dl      Bilirubin, UA Negative     Occult Blood, UA Moderate    D-dimer, quantitative [[id number]] Collected: 01/12/25 1013    Lab Status: Edited Result - FINAL Specimen: Blood from Arm, Right Updated: 01/12/25 1245     D-Dimer, Quant --    Narrative:      In the evaluation for possible pulmonary embolism, in the appropriate (Well's Score of 4 or less) patient, the age adjusted d-dimer cutoff for this patient can be calculated as:    Age x 0.01 (in ug/mL) for Age-adjusted D-dimer exclusion threshold for a patient over 50 years.    Procalcitonin [[id number]]  (Normal) Collected: 01/12/25 1013    Lab Status: Final result Specimen: Blood from Arm, Right Updated: 01/12/25 1053     Procalcitonin 0.11 ng/ml     HS Troponin 0hr (reflex protocol) [[id number]]  (Normal) Collected: 01/12/25 1013    Lab Status: Final result Specimen: Blood from Arm, Right Updated: 01/12/25 1049     hs TnI 0hr 7 ng/L     Lactic acid [[id number]]  (Normal) Collected: 01/12/25 1013    Lab Status: Final result Specimen: Blood from Arm, Right Updated: 01/12/25 1045     LACTIC ACID 1.6 mmol/L     Narrative:      Result may be elevated if tourniquet was used during collection.    Lipase [[id number]]  (Abnormal) Collected: 01/12/25 1013    Lab Status: Final result Specimen: Blood from Arm, Right Updated: 01/12/25 1045     Lipase 6 u/L     Comprehensive metabolic panel [[id number]]  (Abnormal) Collected: 01/12/25 1013    Lab Status: Final result Specimen: Blood from Arm, Right Updated: 01/12/25 1045     Sodium 137 mmol/L      Potassium 3.7 mmol/L      Chloride 102 mmol/L      CO2 26 mmol/L      ANION GAP 9 mmol/L      BUN 16 mg/dL      Creatinine 0.79 mg/dL      Glucose 89 mg/dL      Calcium 8.9 mg/dL       AST 12 U/L      ALT 11 U/L      Alkaline Phosphatase 77 U/L      Total Protein 7.4 g/dL      Albumin 4.0 g/dL      Total Bilirubin 0.21 mg/dL      eGFR 84 ml/min/1.73sq m     Narrative:      National Kidney Disease Foundation guidelines for Chronic Kidney Disease (CKD):     Stage 1 with normal or high GFR (GFR > 90 mL/min/1.73 square meters)    Stage 2 Mild CKD (GFR = 60-89 mL/min/1.73 square meters)    Stage 3A Moderate CKD (GFR = 45-59 mL/min/1.73 square meters)    Stage 3B Moderate CKD (GFR = 30-44 mL/min/1.73 square meters)    Stage 4 Severe CKD (GFR = 15-29 mL/min/1.73 square meters)    Stage 5 End Stage CKD (GFR <15 mL/min/1.73 square meters)  Note: GFR calculation is accurate only with a steady state creatinine    CBC and differential [[id number]]  (Abnormal) Collected: 01/12/25 1013    Lab Status: Final result Specimen: Blood from Arm, Right Updated: 01/12/25 1026     WBC 7.87 Thousand/uL      RBC 3.63 Million/uL      Hemoglobin 8.9 g/dL      Hematocrit 28.9 %      MCV 80 fL      MCH 24.5 pg      MCHC 30.8 g/dL      RDW 15.6 %      MPV 8.5 fL      Platelets 514 Thousands/uL      nRBC 0 /100 WBCs      Segmented % 86 %      Immature Grans % 1 %      Lymphocytes % 7 %      Monocytes % 6 %      Eosinophils Relative 0 %      Basophils Relative 0 %      Absolute Neutrophils 6.75 Thousands/µL      Absolute Immature Grans 0.08 Thousand/uL      Absolute Lymphocytes 0.52 Thousands/µL      Absolute Monocytes 0.48 Thousand/µL      Eosinophils Absolute 0.01 Thousand/µL      Basophils Absolute 0.03 Thousands/µL     FLU/COVID Rapid Antigen (30 min. TAT) - Preferred screening test in ED [[id number]]  (Normal) Collected: 01/12/25 0921    Lab Status: Final result Specimen: Nares from Nose Updated: 01/12/25 1009     SARS COV Rapid Antigen Negative     Influenza A Rapid Antigen Negative     Influenza B Rapid Antigen Negative    Narrative:      This test has been performed using the Quidel Leona 2 FLU+SARS Antigen test under  the Emergency Use Authorization (EUA). This test has been validated by the  and verified by the performing laboratory. The Leona uses lateral flow immunofluorescent sandwich assay to detect SARS-COV, Influenza A and Influenza B Antigen.     The yavaluidel Leona 2 SARS Antigen test does not differentiate between SARS-CoV and SARS-CoV-2.     Negative results are presumptive and may be confirmed with a molecular assay, if necessary, for patient management. Negative results do not rule out SARS-CoV-2 or influenza infection and should not be used as the sole basis for treatment or patient management decisions. A negative test result may occur if the level of antigen in a sample is below the limit of detection of this test.     Positive results are indicative of the presence of viral antigens, but do not rule out bacterial infection or co-infection with other viruses.     All test results should be used as an adjunct to clinical observations and other information available to the provider.    FOR PEDIATRIC PATIENTS - copy/paste COVID Guidelines URL to browser: [email or web address]            CT chest abdomen pelvis w contrast   Final Interpretation by Mahamed Desouza MD (01/12 1224)   1.  Right lower lobe bronchiolitis.   2.  Large hiatal hernia.   3.  Probable uterine fibroids. Nonemergent pelvic ultrasound is advised for further assessment.   4.  Cholelithiasis without acute cholecystitis.                  Workstation performed: [id number]             ECG 12 Lead Documentation Only    Date/Time: 1/12/2025 3:59 PM    Performed by: Terry Soto DO  Authorized by: Terry Soto DO    Indications / Diagnosis:  Sob cp  ECG reviewed by me, the ED Provider: yes    Patient location:  ED  Previous ECG:     Previous ECG:  Compared to current    Comparison ECG info:  2022    Similarity:  Changes noted  Interpretation:     Interpretation: normal    Rate:     ECG rate:   124    ECG rate assessment: tachycardic    Rhythm:     Rhythm: sinus rhythm    Ectopy:     Ectopy: none    QRS:     QRS axis:  Normal    QRS intervals:  Normal  Conduction:     Conduction: normal    ST segments:     ST segments:  Normal  T waves:     T waves: non-specific and inverted      Inverted:  V3, V4 and III  Comments:      This EKG was interpreted by me.      ED Medication and Procedure Management   Prior to Admission Medications   Prescriptions Last Dose Informant Patient Reported? Taking?   Flovent  MCG/ACT inhaler   Yes No   Sig: Inhale 1 puff 2 (two) times a day   albuterol (PROVENTIL HFA,VENTOLIN HFA) 90 mcg/act inhaler  Self Yes No   Sig: inhale 2 puffs every 4 to 6 hours if needed for wheezing   albuterol (PROVENTIL HFA,VENTOLIN HFA) 90 mcg/act inhaler   No Yes   Sig: Inhale 2 puffs every 4 (four) hours as needed for wheezing   cloNIDine (CATAPRES) 0.1 mg tablet   No No   Sig: Clonidine 0.1m -2 tablet po PRN.  Hold if BP< 100/<60; and pulse < 60   fexofenadine (ALLEGRA) 180 MG tablet   Yes No   Sig: Take 180 mg by mouth daily   fluticasone (FLONASE) 50 mcg/act nasal spray   Yes No   Si spray 2 (two) times a day   lamoTRIgine (LaMICtal) 25 mg tablet   No No   Sig: Lamotrigine 25m tablet po daily x 2 wks. Then Increase to 2 tablets daily   omeprazole (PriLOSEC) 40 MG capsule   No No   Sig: take 1 capsule by mouth daily      Facility-Administered Medications: None     Discharge Medication List as of 2025  2:44 PM        START taking these medications    Details   methylprednisolone (MEDROL) 4 mg tablet Take 1 tablet (4 mg total) by mouth see administration instructions Medrol dose pack take as directed Do not start before 2025., Starting Mon 2025, Normal           CONTINUE these medications which have CHANGED    Details   albuterol (PROVENTIL HFA,VENTOLIN HFA) 90 mcg/act inhaler Inhale 2 puffs every 4 (four) hours as needed for wheezing, Starting Sun 2025,  Normal           CONTINUE these medications which have NOT CHANGED    Details   cloNIDine (CATAPRES) 0.1 mg tablet Clonidine 0.1m -2 tablet po PRN.  Hold if BP< 100/<60; and pulse < 60, Normal      fexofenadine (ALLEGRA) 180 MG tablet Take 180 mg by mouth daily, Starting 2021, Historical Med      Flovent  MCG/ACT inhaler Inhale 1 puff 2 (two) times a day, Starting 2021, Historical Med      fluticasone (FLONASE) 50 mcg/act nasal spray 1 spray 2 (two) times a day, Starting 2021, Historical Med      lamoTRIgine (LaMICtal) 25 mg tablet Lamotrigine 25m tablet po daily x 2 wks. Then Increase to 2 tablets daily, Normal      omeprazole (PriLOSEC) 40 MG capsule take 1 capsule by mouth daily, Normal           No discharge procedures on file.  ED SEPSIS DOCUMENTATION   Time reflects when diagnosis was documented in both MDM as applicable and the Disposition within this note       Time User Action Codes Description Comment    2025  2:43 PM Terry Soto Add [J45.909] Asthma           given 2 breathing tx in ER, no hypoxia, tolerating oral intake       Terry Soto DO  [id number]

## 2025-01-17 LAB
BACTERIA BLD CULT: NORMAL
BACTERIA BLD CULT: NORMAL

## 2025-02-12 ENCOUNTER — TELEPHONE (OUTPATIENT)
Dept: PSYCHIATRY | Facility: CLINIC | Age: 55
End: 2025-02-12

## 2025-04-01 LAB — HBA1C MFR BLD HPLC: 5.6 %

## 2025-04-09 ENCOUNTER — TELEPHONE (OUTPATIENT)
Dept: GASTROENTEROLOGY | Facility: CLINIC | Age: 55
End: 2025-04-09

## 2025-04-09 ENCOUNTER — OFFICE VISIT (OUTPATIENT)
Dept: GASTROENTEROLOGY | Facility: CLINIC | Age: 55
End: 2025-04-09
Payer: COMMERCIAL

## 2025-04-09 VITALS
SYSTOLIC BLOOD PRESSURE: 112 MMHG | WEIGHT: 179.6 LBS | HEIGHT: 62 IN | DIASTOLIC BLOOD PRESSURE: 82 MMHG | BODY MASS INDEX: 33.05 KG/M2

## 2025-04-09 DIAGNOSIS — K44.9 PARAESOPHAGEAL HERNIA: Primary | ICD-10-CM

## 2025-04-09 DIAGNOSIS — K21.00 GASTROESOPHAGEAL REFLUX DISEASE WITH ESOPHAGITIS WITHOUT HEMORRHAGE: ICD-10-CM

## 2025-04-09 DIAGNOSIS — Z12.11 COLON CANCER SCREENING: ICD-10-CM

## 2025-04-09 DIAGNOSIS — D50.9 MICROCYTIC ANEMIA: ICD-10-CM

## 2025-04-09 PROCEDURE — 99214 OFFICE O/P EST MOD 30 MIN: CPT | Performed by: PHYSICIAN ASSISTANT

## 2025-04-09 RX ORDER — MONTELUKAST SODIUM 10 MG/1
10 TABLET ORAL DAILY
COMMUNITY

## 2025-04-09 RX ORDER — SODIUM CHLORIDE, SODIUM LACTATE, POTASSIUM CHLORIDE, CALCIUM CHLORIDE 600; 310; 30; 20 MG/100ML; MG/100ML; MG/100ML; MG/100ML
125 INJECTION, SOLUTION INTRAVENOUS CONTINUOUS
OUTPATIENT
Start: 2025-04-09

## 2025-04-09 NOTE — PROGRESS NOTES
Name: Lupis Day      : 1970      MRN: [id number]  Encounter Provider: Karrie Varela PA-C  Encounter Date: 2025   Encounter department: [facility]  :  Assessment & Plan  Paraesophageal hernia  Patient with history of large paraesophageal hernia, esophageal stricture, GERD with esophagitis.  Has been on PPI regularly for 1 year with good control of reflux and dysphagia.  Was recommended to have hernia repair in past but never followed up.  Now with new iron deficiency anemia.  I did recommend an endoscopy to evaluate for esophagogastroduodenitis, peptic ulcer disease, H. pylori, HH, Chung lesions etc. I reviewed indications, risks, benefits and alternatives on endoscopy and pt is willing to proceed.  An antireflux regiment and lifestyle modifications were reviewed.  Pending results can consider repeat barium esophagram and referral to cardiothoracic surgery for paraesophageal hernia repair.    Orders:    EGD; Future    Gastroesophageal reflux disease with esophagitis without hemorrhage  As above  Orders:    EGD; Future  -cont omeprazole 40 mg qd  Colon cancer screening  I did recommend a colonoscopy for colorectal cancer screening and to evaluate suspected JIMMY. I reviewed the indications, risks, benefits and alternatives of a colonoscopy and the patient is willing to proceed.     Orders:    Colonoscopy; Future    sodium picosulfate, magnesium oxide, citric acid (Clenpiq) oral solution; Take 175 mL (1 bottle) the evening before the colonoscopy, between 5 PM and 9 PM, followed by a second 175 mL bottle 5 hours before the colonoscopy.    Microcytic anemia  In last 1 year course was found to have JIMMY requiring IV iron when following in [facility].  Has not received replacement this year.  CBC 2025 shows hemoglobin 7.9 microcytic in nature.  Will repeat CBC and iron panel.  Patient reports dyspnea on exertion but denies presyncope.  Advised if symptoms  worsen to go to ER.  Will transfuse if Hg <7.  Encouraged to complete blood work before procedures.  Rec EGD/Colonoscopy as above to eval for GI blood loss, if negative consider VCE.  Also encouraged to establish with GYN as she still gets her menses.  Orders:    EGD; Future    Fe+TIBC+Luis    CBC; Future  Follow up: EGD/COLON, 2m OV      History of Present Illness   HPI  Lupis Day is a 54 y.o. female with PMH asthma, obesity, anxiety, PTSD history of alcohol use in remission for 1.5 years being evaluated for reflux paraesophageal hernia anemia and colon screening.  Patient never had colonoscopy.  No family history of GI malignancy.    9/2021 EGD for dysphagia showed peptic stricture at GE junction traversable dilated to 18 mm balloon, erosion in lower esophagus 5 cm hiatal hernia.  Gastric and esophageal biopsies negative.  Started on omeprazole 40 daily.    9/2022 esophageal manometry with 24-hour pH showed normal esophageal motility with large hiatal hernia study significant for acid reflux symptom correlation not significant.    9/2022 BE: large hiatal hernia with spontaneous reflux to proximal esophagus, esophageal dysmotility with mild narrowing at the GE junction without fixed obstruction.    Saw [facility] cardiothoracic surgery 10/2022 for large paraesophageal hernia with severe reflux recommend robotic paraesophageal hernia repair and partial fundoplication. Pt never had surgery.    Seen at [facility] for 2nd opinion 10/2022 for reflux with peptic stricture, large hiatal hernia recommend acid suppression PPI or famotidine, patient preferred to be on homeopathic remedies, best options for future without meds would be surgical repair of her hernia (referral placed,  advised to complete Cologuard.    1/2025 CMP, lipase normal. CBC showed microcytic anemia 8.9 thrombocytosis 514,000.  4/1/2025 CBC with microcytic anemia 7.8     CT C/A/P w/ contrast 1/2025 right bronchiolitis, large hiatal hernia, probable  uterine fibroids, gallstones without acute cholecystitis or biliary dilation.    Patient states was unable to take care of health issues due to things going on in her life.  Was drinking a bottle of tequila a week for a few years but stopped 1.5 years ago, denies history of cirrhosis.  Is ready to follow-up on her chronic GI issues.  States has chronic reflux currently controlled with omeprazole 40 mg daily without breakthrough.  Has been on this regularly for 1 year prior to this was taking homeopathic remedies.  The dysphagia has not been an issue since being on PPI. Having daily bowel movements without bleeding.  Eating well and weight stable.   Denies vomiting or abdominal pain.  Uses Midol during her menses which happen every 2 months for 5 days the first day being heavy.  Patient has not seen GYN recently.  Reports was on IV iron a year ago when followed in [facility] not currently on oral iron. Pt never needed blood transfusion.  Does note dyspnea on exertion but denies presyncope.    Review of Systems  Constitutional: denies fatigue, fever.  HEENT: denies visual disturbance, postnasal drip, sore throat.  Respiratory: denies cough, shortness of breath.  Cardiovascular: denies chest pain, leg swelling.  Gastrointestinal: as noted above in HPI.  : denies difficulty urinating, dysuria.  Musculoskeletal: denies arthralgias, back pain.  Neurological: denies dizziness, syncope.  Psychiatric: denies confusion, + anxiety.       Objective   There were no vitals taken for this visit.     Physical Exam  Constitutional: Well-developed, no acute distress  HEENT: normocephalic, mucous membranes moist.  Neck: Supple  Skin: warm and dry  Respiratory: Lungs are clear to auscultation B/L.  Cardiovascular: Heart is regular rate and rhythm.  Gastrointestinal: obese, Soft, nontender, nondistended with normal active bowel sounds.  No masses, guarding, rebound.   Rectal Exam: Deferred.  Extremities: No edema.  Neurologic: Nonfocal. A  & O ×3.   Psychiatric: Normal affect.

## 2025-04-09 NOTE — PATIENT INSTRUCTIONS
-cont omeprazole  -labs  -EGD/COLON  -antireflux diet  -if feels presyncopal, chest pain SOB go to ER  -2m OV

## 2025-04-09 NOTE — H&P (VIEW-ONLY)
Name: Lupis Day      : 1970      MRN: [id number]  Encounter Provider: Karrie Varela PA-C  Encounter Date: 2025   Encounter department: [facility]  :  Assessment & Plan  Paraesophageal hernia  Patient with history of large paraesophageal hernia, esophageal stricture, GERD with esophagitis.  Has been on PPI regularly for 1 year with good control of reflux and dysphagia.  Was recommended to have hernia repair in past but never followed up.  Now with new iron deficiency anemia.  I did recommend an endoscopy to evaluate for esophagogastroduodenitis, peptic ulcer disease, H. pylori, HH, Chung lesions etc. I reviewed indications, risks, benefits and alternatives on endoscopy and pt is willing to proceed.  An antireflux regiment and lifestyle modifications were reviewed.  Pending results can consider repeat barium esophagram and referral to cardiothoracic surgery for paraesophageal hernia repair.    Orders:    EGD; Future    Gastroesophageal reflux disease with esophagitis without hemorrhage  As above  Orders:    EGD; Future  -cont omeprazole 40 mg qd  Colon cancer screening  I did recommend a colonoscopy for colorectal cancer screening and to evaluate suspected JIMMY. I reviewed the indications, risks, benefits and alternatives of a colonoscopy and the patient is willing to proceed.     Orders:    Colonoscopy; Future    sodium picosulfate, magnesium oxide, citric acid (Clenpiq) oral solution; Take 175 mL (1 bottle) the evening before the colonoscopy, between 5 PM and 9 PM, followed by a second 175 mL bottle 5 hours before the colonoscopy.    Microcytic anemia  In last 1 year course was found to have JIMMY requiring IV iron when following in [facility].  Has not received replacement this year.  CBC 2025 shows hemoglobin 7.9 microcytic in nature.  Will repeat CBC and iron panel.  Patient reports dyspnea on exertion but denies presyncope.  Advised if symptoms  worsen to go to ER.  Will transfuse if Hg <7.  Encouraged to complete blood work before procedures.  Rec EGD/Colonoscopy as above to eval for GI blood loss, if negative consider VCE.  Also encouraged to establish with GYN as she still gets her menses.  Orders:    EGD; Future    Fe+TIBC+Luis    CBC; Future  Follow up: EGD/COLON, 2m OV      History of Present Illness   HPI  Lupis Day is a 54 y.o. female with PMH asthma, obesity, anxiety, PTSD history of alcohol use in remission for 1.5 years being evaluated for reflux paraesophageal hernia anemia and colon screening.  Patient never had colonoscopy.  No family history of GI malignancy.    9/2021 EGD for dysphagia showed peptic stricture at GE junction traversable dilated to 18 mm balloon, erosion in lower esophagus 5 cm hiatal hernia.  Gastric and esophageal biopsies negative.  Started on omeprazole 40 daily.    9/2022 esophageal manometry with 24-hour pH showed normal esophageal motility with large hiatal hernia study significant for acid reflux symptom correlation not significant.    9/2022 BE: large hiatal hernia with spontaneous reflux to proximal esophagus, esophageal dysmotility with mild narrowing at the GE junction without fixed obstruction.    Saw [facility] cardiothoracic surgery 10/2022 for large paraesophageal hernia with severe reflux recommend robotic paraesophageal hernia repair and partial fundoplication. Pt never had surgery.    Seen at [facility] for 2nd opinion 10/2022 for reflux with peptic stricture, large hiatal hernia recommend acid suppression PPI or famotidine, patient preferred to be on homeopathic remedies, best options for future without meds would be surgical repair of her hernia (referral placed,  advised to complete Cologuard.    1/2025 CMP, lipase normal. CBC showed microcytic anemia 8.9 thrombocytosis 514,000.  4/1/2025 CBC with microcytic anemia 7.8     CT C/A/P w/ contrast 1/2025 right bronchiolitis, large hiatal hernia, probable  uterine fibroids, gallstones without acute cholecystitis or biliary dilation.    Patient states was unable to take care of health issues due to things going on in her life.  Was drinking a bottle of tequila a week for a few years but stopped 1.5 years ago, denies history of cirrhosis.  Is ready to follow-up on her chronic GI issues.  States has chronic reflux currently controlled with omeprazole 40 mg daily without breakthrough.  Has been on this regularly for 1 year prior to this was taking homeopathic remedies.  The dysphagia has not been an issue since being on PPI. Having daily bowel movements without bleeding.  Eating well and weight stable.   Denies vomiting or abdominal pain.  Uses Midol during her menses which happen every 2 months for 5 days the first day being heavy.  Patient has not seen GYN recently.  Reports was on IV iron a year ago when followed in [facility] not currently on oral iron. Pt never needed blood transfusion.  Does note dyspnea on exertion but denies presyncope.    Review of Systems  Constitutional: denies fatigue, fever.  HEENT: denies visual disturbance, postnasal drip, sore throat.  Respiratory: denies cough, shortness of breath.  Cardiovascular: denies chest pain, leg swelling.  Gastrointestinal: as noted above in HPI.  : denies difficulty urinating, dysuria.  Musculoskeletal: denies arthralgias, back pain.  Neurological: denies dizziness, syncope.  Psychiatric: denies confusion, + anxiety.       Objective   There were no vitals taken for this visit.     Physical Exam  Constitutional: Well-developed, no acute distress  HEENT: normocephalic, mucous membranes moist.  Neck: Supple  Skin: warm and dry  Respiratory: Lungs are clear to auscultation B/L.  Cardiovascular: Heart is regular rate and rhythm.  Gastrointestinal: obese, Soft, nontender, nondistended with normal active bowel sounds.  No masses, guarding, rebound.   Rectal Exam: Deferred.  Extremities: No edema.  Neurologic: Nonfocal. A  & O ×3.   Psychiatric: Normal affect.

## 2025-04-09 NOTE — TELEPHONE ENCOUNTER
Scheduled date of combo (as of today):4/25/25  Physician performing combo:CH  Location of combo:[facility]  Bowel prep reviewed with patient:Clenpiq  Instructions reviewed with patient by:TISH  Clearances: N

## 2025-04-09 NOTE — ASSESSMENT & PLAN NOTE
Patient with history of large paraesophageal hernia, esophageal stricture, GERD with esophagitis.  Has been on PPI regularly for 1 year with good control of reflux and dysphagia.  Was recommended to have hernia repair in past but never followed up.  Now with new iron deficiency anemia.  I did recommend an endoscopy to evaluate for esophagogastroduodenitis, peptic ulcer disease, H. pylori, HH, Chung lesions etc. I reviewed indications, risks, benefits and alternatives on endoscopy and pt is willing to proceed.  An antireflux regiment and lifestyle modifications were reviewed.  Pending results can consider repeat barium esophagram and referral to cardiothoracic surgery for paraesophageal hernia repair.    Orders:    EGD; Future

## 2025-04-11 ENCOUNTER — ANESTHESIA (OUTPATIENT)
Dept: ANESTHESIOLOGY | Facility: AMBULATORY SURGERY CENTER | Age: 55
End: 2025-04-11

## 2025-04-11 ENCOUNTER — ANESTHESIA EVENT (OUTPATIENT)
Dept: ANESTHESIOLOGY | Facility: AMBULATORY SURGERY CENTER | Age: 55
End: 2025-04-11

## 2025-04-14 ENCOUNTER — TELEPHONE (OUTPATIENT)
Dept: GASTROENTEROLOGY | Facility: CLINIC | Age: 55
End: 2025-04-14

## 2025-04-23 ENCOUNTER — RESULTS FOLLOW-UP (OUTPATIENT)
Dept: GASTROENTEROLOGY | Facility: CLINIC | Age: 55
End: 2025-04-23

## 2025-04-23 ENCOUNTER — TELEPHONE (OUTPATIENT)
Dept: PSYCHIATRY | Facility: CLINIC | Age: 55
End: 2025-04-23

## 2025-04-23 DIAGNOSIS — D50.0 IRON DEFICIENCY ANEMIA DUE TO CHRONIC BLOOD LOSS: Primary | ICD-10-CM

## 2025-04-23 LAB
ERYTHROCYTE [DISTWIDTH] IN BLOOD BY AUTOMATED COUNT: 17.2 % (ref 11.7–15.4)
FERRITIN SERPL-MCNC: 6 NG/ML (ref 15–150)
HCT VFR BLD AUTO: 26.8 % (ref 34–46.6)
HGB BLD-MCNC: 7.4 G/DL (ref 11.1–15.9)
IRON SATN MFR SERPL: 4 % (ref 15–55)
IRON SERPL-MCNC: 16 UG/DL (ref 27–159)
MCH RBC QN AUTO: 18.7 PG (ref 26.6–33)
MCHC RBC AUTO-ENTMCNC: 27.6 G/DL (ref 31.5–35.7)
MCV RBC AUTO: 68 FL (ref 79–97)
PLATELET # BLD AUTO: 554 X10E3/UL (ref 150–450)
RBC # BLD AUTO: 3.96 X10E6/UL (ref 3.77–5.28)
TIBC SERPL-MCNC: 452 UG/DL (ref 250–450)
UIBC SERPL-MCNC: 436 UG/DL (ref 131–425)
WBC # BLD AUTO: 7.5 X10E3/UL (ref 3.4–10.8)

## 2025-04-24 PROBLEM — D50.0 IRON DEFICIENCY ANEMIA DUE TO CHRONIC BLOOD LOSS: Status: ACTIVE | Noted: 2025-04-24

## 2025-04-24 RX ORDER — SODIUM CHLORIDE 9 MG/ML
20 INJECTION, SOLUTION INTRAVENOUS ONCE
OUTPATIENT
Start: 2025-05-01

## 2025-04-24 NOTE — TELEPHONE ENCOUNTER
Spoke with patient lab results reviewed.  Feeling okay denies presyncope.  Believes she is okay to prep for endoscopy/colonoscopy for tomorrow.  Has never taken oral iron  and she would prefer IV iron as she tolerated this in the past without reaction and may be quicker hemoglobin/iron improvement.  Will set this up.

## 2025-04-25 ENCOUNTER — HOSPITAL ENCOUNTER (OUTPATIENT)
Dept: GASTROENTEROLOGY | Facility: AMBULATORY SURGERY CENTER | Age: 55
Discharge: HOME | End: 2025-04-25
Payer: COMMERCIAL

## 2025-04-25 ENCOUNTER — ANESTHESIA EVENT (OUTPATIENT)
Dept: GASTROENTEROLOGY | Facility: AMBULATORY SURGERY CENTER | Age: 55
End: 2025-04-25

## 2025-04-25 ENCOUNTER — ANESTHESIA (OUTPATIENT)
Dept: GASTROENTEROLOGY | Facility: AMBULATORY SURGERY CENTER | Age: 55
End: 2025-04-25

## 2025-04-25 VITALS
DIASTOLIC BLOOD PRESSURE: 56 MMHG | HEIGHT: 62 IN | HEART RATE: 84 BPM | RESPIRATION RATE: 18 BRPM | BODY MASS INDEX: 32.94 KG/M2 | OXYGEN SATURATION: 100 % | WEIGHT: 179 LBS | SYSTOLIC BLOOD PRESSURE: 102 MMHG | TEMPERATURE: 97.7 F

## 2025-04-25 DIAGNOSIS — Z12.11 COLON CANCER SCREENING: ICD-10-CM

## 2025-04-25 DIAGNOSIS — K44.9 PARAESOPHAGEAL HERNIA: ICD-10-CM

## 2025-04-25 DIAGNOSIS — D50.9 MICROCYTIC ANEMIA: ICD-10-CM

## 2025-04-25 DIAGNOSIS — K21.00 GASTROESOPHAGEAL REFLUX DISEASE WITH ESOPHAGITIS WITHOUT HEMORRHAGE: ICD-10-CM

## 2025-04-25 LAB
EXT PREGNANCY TEST URINE: NEGATIVE
EXT. CONTROL: NORMAL

## 2025-04-25 PROCEDURE — 43235 EGD DIAGNOSTIC BRUSH WASH: CPT | Performed by: INTERNAL MEDICINE

## 2025-04-25 PROCEDURE — 45380 COLONOSCOPY AND BIOPSY: CPT | Performed by: INTERNAL MEDICINE

## 2025-04-25 PROCEDURE — 45385 COLONOSCOPY W/LESION REMOVAL: CPT | Performed by: INTERNAL MEDICINE

## 2025-04-25 PROCEDURE — 88305 TISSUE EXAM BY PATHOLOGIST: CPT | Performed by: PATHOLOGY

## 2025-04-25 RX ORDER — PROPOFOL 10 MG/ML
INJECTION, EMULSION INTRAVENOUS AS NEEDED
Status: DISCONTINUED | OUTPATIENT
Start: 2025-04-25 | End: 2025-04-25

## 2025-04-25 RX ORDER — LIDOCAINE HYDROCHLORIDE 20 MG/ML
INJECTION, SOLUTION EPIDURAL; INFILTRATION; INTRACAUDAL; PERINEURAL AS NEEDED
Status: DISCONTINUED | OUTPATIENT
Start: 2025-04-25 | End: 2025-04-25

## 2025-04-25 RX ORDER — SODIUM CHLORIDE, SODIUM LACTATE, POTASSIUM CHLORIDE, CALCIUM CHLORIDE 600; 310; 30; 20 MG/100ML; MG/100ML; MG/100ML; MG/100ML
50 INJECTION, SOLUTION INTRAVENOUS CONTINUOUS
Status: DISCONTINUED | OUTPATIENT
Start: 2025-04-25 | End: 2025-04-29 | Stop reason: HOSPADM

## 2025-04-25 RX ADMIN — PROPOFOL 30 MG: 10 INJECTION, EMULSION INTRAVENOUS at 14:01

## 2025-04-25 RX ADMIN — SODIUM CHLORIDE, SODIUM LACTATE, POTASSIUM CHLORIDE, CALCIUM CHLORIDE 50 ML/HR: 600; 310; 30; 20 INJECTION, SOLUTION INTRAVENOUS at 13:28

## 2025-04-25 RX ADMIN — PROPOFOL 150 MG: 10 INJECTION, EMULSION INTRAVENOUS at 13:44

## 2025-04-25 RX ADMIN — PROPOFOL 20 MG: 10 INJECTION, EMULSION INTRAVENOUS at 13:58

## 2025-04-25 RX ADMIN — LIDOCAINE HYDROCHLORIDE 100 MG: 20 INJECTION, SOLUTION EPIDURAL; INFILTRATION; INTRACAUDAL; PERINEURAL at 13:44

## 2025-04-25 RX ADMIN — PROPOFOL 50 MG: 10 INJECTION, EMULSION INTRAVENOUS at 13:52

## 2025-04-25 RX ADMIN — PROPOFOL 30 MG: 10 INJECTION, EMULSION INTRAVENOUS at 13:57

## 2025-04-25 RX ADMIN — PROPOFOL 50 MG: 10 INJECTION, EMULSION INTRAVENOUS at 13:48

## 2025-04-25 RX ADMIN — PROPOFOL 30 MG: 10 INJECTION, EMULSION INTRAVENOUS at 14:07

## 2025-04-25 RX ADMIN — PROPOFOL 30 MG: 10 INJECTION, EMULSION INTRAVENOUS at 14:11

## 2025-04-25 RX ADMIN — PROPOFOL 30 MG: 10 INJECTION, EMULSION INTRAVENOUS at 14:02

## 2025-04-25 NOTE — INTERVAL H&P NOTE
H&P reviewed. After examining the patient I find no changes in the patients condition since the H&P had been written.    Vitals:    04/25/25 1316   BP: 125/82   Pulse: 93   Resp: 16   Temp: 97.7 °F (36.5 °C)   SpO2: 98%

## 2025-04-25 NOTE — ANESTHESIA POSTPROCEDURE EVALUATION
Post-Op Assessment Note    CV Status:  Stable    Pain management: adequate       Mental Status:  Sleepy   Hydration Status:  Euvolemic   PONV Controlled:  Controlled   Airway Patency:  Patent     Post Op Vitals Reviewed: Yes    No anethesia notable event occurred.    Staff: CRNA           Last Filed PACU Vitals:  Vitals Value Taken Time   Temp     Pulse 104 04/25/25 1418   /67 04/25/25 1418   Resp 20 04/25/25 1418   SpO2 97 % 04/25/25 1418       Modified Radha:     Vitals Value Taken Time   Activity 0 04/25/25 1417   Respiration 2 04/25/25 1417   Circulation 2 04/25/25 1417   Consciousness 0 04/25/25 1417   Oxygen Saturation 2 04/25/25 1417     Modified Radha Score: 6

## 2025-04-25 NOTE — ANESTHESIA PREPROCEDURE EVALUATION
Procedure:  COLONOSCOPY  EGD   large paraesophageal hernia, esophageal stricture, GERD with esophagitis     Relevant Problems   GI/HEPATIC   (+) Dysphagia   (+) GERD (gastroesophageal reflux disease)   (+) Paraesophageal hernia      HEMATOLOGY   (+) Iron deficiency anemia due to chronic blood loss      MUSCULOSKELETAL   (+) Paraesophageal hernia      NEURO/PSYCH   (+) Generalized anxiety disorder   (+) PTSD (post-traumatic stress disorder)      PULMONARY   (+) Asthma      Recent labs personally reviewed:  Lab Results   Component Value Date    WBC 7.5 04/22/2025    HGB 7.4 (L) 04/22/2025     (H) 04/22/2025     Lab Results   Component Value Date    K 3.7 01/12/2025    BUN 16 01/12/2025    CREATININE 0.79 01/12/2025     Lab Results   Component Value Date    PTT 29 06/04/2022      Lab Results   Component Value Date    INR 0.9 10/06/2022       Lab Results   Component Value Date    HGBA1C 5.6 04/01/2025       Type and Screen:  O      Physical Exam    Airway    Mallampati score: II  TM Distance: >3 FB  Neck ROM: full     Dental   No notable dental hx     Cardiovascular      Pulmonary      Other Findings  post-pubertal.      Anesthesia Plan  ASA Score- 2     Anesthesia Type- IV sedation with anesthesia with ASA Monitors.         Additional Monitors:     Airway Plan:            Plan Factors-Exercise tolerance (METS): >4 METS.    Chart reviewed.    Patient summary reviewed.    Patient is not a current smoker.              Induction- intravenous.    Postoperative Plan-         Informed Consent- Anesthetic plan and risks discussed with patient.  I personally reviewed this patient with the CRNA. Discussed and agreed on the Anesthesia Plan with the CRNA..      NPO Status:  No vitals data found for the desired time range.

## 2025-04-29 ENCOUNTER — TELEPHONE (OUTPATIENT)
Dept: PSYCHIATRY | Facility: CLINIC | Age: 55
End: 2025-04-29

## 2025-04-29 ENCOUNTER — RESULTS FOLLOW-UP (OUTPATIENT)
Dept: GASTROENTEROLOGY | Facility: CLINIC | Age: 55
End: 2025-04-29

## 2025-04-29 PROCEDURE — 88305 TISSUE EXAM BY PATHOLOGIST: CPT | Performed by: PATHOLOGY

## 2025-05-12 ENCOUNTER — OFFICE VISIT (OUTPATIENT)
Dept: GASTROENTEROLOGY | Facility: CLINIC | Age: 55
End: 2025-05-12
Payer: COMMERCIAL

## 2025-05-12 VITALS
DIASTOLIC BLOOD PRESSURE: 70 MMHG | SYSTOLIC BLOOD PRESSURE: 110 MMHG | WEIGHT: 174 LBS | BODY MASS INDEX: 32.02 KG/M2 | HEIGHT: 62 IN

## 2025-05-12 DIAGNOSIS — Z86.0100 PERSONAL HISTORY OF COLON POLYPS, UNSPECIFIED: ICD-10-CM

## 2025-05-12 DIAGNOSIS — K44.9 HIATAL HERNIA: ICD-10-CM

## 2025-05-12 DIAGNOSIS — K21.00 GASTROESOPHAGEAL REFLUX DISEASE WITH ESOPHAGITIS WITHOUT HEMORRHAGE: Primary | ICD-10-CM

## 2025-05-12 PROCEDURE — 99213 OFFICE O/P EST LOW 20 MIN: CPT | Performed by: INTERNAL MEDICINE

## 2025-05-12 NOTE — ASSESSMENT & PLAN NOTE
Feeling well, but patient would prefer to surgically address her hiatal hernia and reflux.  Referral placed.  I do not think she needs repeat manometry or pH impedance testing, but will defer to surgery  Orders:    Ambulatory Referral to Thoracic Surgery; Future

## 2025-05-12 NOTE — PROGRESS NOTES
"Name: Lupis Day      : 1970      MRN: [id number]  Encounter Provider: Sammy Parker DO  Encounter Date: 2025   Encounter department: [facility]  :  Assessment & Plan  Gastroesophageal reflux disease with esophagitis without hemorrhage  Feeling well, but patient would prefer to surgically address her hiatal hernia and reflux.  Referral placed.  I do not think she needs repeat manometry or pH impedance testing, but will defer to surgery  Orders:    Ambulatory Referral to Thoracic Surgery; Future    Hiatal hernia  As above  Orders:    Ambulatory Referral to Thoracic Surgery; Future    Personal history of colon polyps, unspecified  Repeat colonoscopy in 3 years due to large tubular adenoma           History of Present Illness   HPI  Lupis Day is a 54 y.o. female who presents in follow-up due to hiatal hernia with GERD and Chung's erosions and personal history of colon polyps.  Recent upper endoscopy confirmed the presence of Chung's erosions, the presumed cause of her iron deficiency anemia.  While she does feel well on her current antisecretory regimen of omeprazole 40 mg daily, she would like to meet with surgery again to discuss hiatal hernia repair and fundoplication.      Review of Systems       Objective   /70   Ht 5' 2\" (1.575 m)   Wt 78.9 kg (174 lb)   BMI 31.83 kg/m²      Physical Exam  General Appearance: NAD, cooperative, alert  Eyes: Anicteric  GI:  Soft, non-tender, non-distended; normal bowel sounds; no masses, no organomegaly   Rectal: Deferred  Musculoskeletal: No edema.  Skin:  No jaundice      "

## 2025-06-06 ENCOUNTER — TELEPHONE (OUTPATIENT)
Dept: HEMATOLOGY ONCOLOGY | Facility: CLINIC | Age: 55
End: 2025-06-06

## 2025-06-06 NOTE — TELEPHONE ENCOUNTER
Called PT To reschedule CX appt for 6/19 with Dr. Khalil.  I have asked the PT to please call us back and reschedule appt.  I have also left the hopeline number.

## 2025-06-30 ENCOUNTER — DOCUMENTATION (OUTPATIENT)
Dept: PSYCHIATRY | Facility: CLINIC | Age: 55
End: 2025-06-30

## 2025-06-30 NOTE — PSYCH
Psychiatric Discharge Summary- Administrative Discharge     Admit Date: See H&P for admit date  Discharge Date: 06/30/25     Discharge Diagnosis: Problem List[1]     Treating Physician: Dr. Wally DO      Presenting Problems/Pertinent Findings: See Initial H&P     Course of Treatment:See Most Recent Med Management Progress Note    The patient's primary treating provider is no longer with practice.  Patient has either not responded to outreach, has not been seen in over a year; last seen 3/04/2024 by Dr. Lo. The chart is being administratively closed at this time.  For treatment course, please request past records when patient was in treatment with primary provider..    MSE from last note, 3/04/24, with Dr. Lo: Mental Status Evaluation:  General Appearance:  Lupis Day is a 53 y.o.  female casually dressed, looks stated age   Behavior:  pleasant, cooperative, appropriate eye contact    Speech:  WNL rate, rhythm, volume, latency, amount,   Mood:  Mildly anxious    Affect:  normal to hyper slightly   Thought Process:  normal and logical   Thought Content:  no overt delusions, normal   Perceptual Disturbances: Does not appear responding or preoccupied  Delusions  w/o   Risk Potential: Suicidal Ideations w/o  Homicidal Ideations w/o  Potential for Aggression  w/o   Sensorium:  Oriented to person, place ([facility]), time/date ( March 4 2024), and situation   Memory:  recent and remote memory grossly intact   Consciousness:  alert and awake   Attention: attention span and concentration are appropriate    Insight:  appropriate   Judgment: appropriate   Gait/Station: normal   Motor Activity: no abnormal movements     Discharge Medications: Current Medications[2]     José Miguel Hernandez DO         [1]   Patient Active Problem List  Diagnosis    GERD (gastroesophageal reflux disease)    Dysphagia    Asthma    Attention deficit hyperactivity disorder, combined type    Paraesophageal hernia    Generalized  anxiety disorder    PTSD (post-traumatic stress disorder)    Alcohol use disorder, mild, in early remission    Iron deficiency anemia due to chronic blood loss   [2]   Current Outpatient Medications:     albuterol (PROVENTIL HFA,VENTOLIN HFA) 90 mcg/act inhaler, Inhale 2 puffs every 4 (four) hours as needed for wheezing, Disp: 6.7 g, Rfl: 1    montelukast (SINGULAIR) 10 mg tablet, Take 10 mg by mouth daily (Patient not taking: Reported on 5/12/2025), Disp: , Rfl:     omeprazole (PriLOSEC) 40 MG capsule, take 1 capsule by mouth daily, Disp: 90 capsule, Rfl: 1